# Patient Record
Sex: FEMALE | Race: WHITE | NOT HISPANIC OR LATINO | Employment: OTHER | URBAN - METROPOLITAN AREA
[De-identification: names, ages, dates, MRNs, and addresses within clinical notes are randomized per-mention and may not be internally consistent; named-entity substitution may affect disease eponyms.]

---

## 2022-03-09 ENCOUNTER — TRANSFERRED RECORDS (OUTPATIENT)
Dept: HEALTH INFORMATION MANAGEMENT | Facility: CLINIC | Age: 67
End: 2022-03-09
Payer: MEDICARE

## 2022-03-29 ENCOUNTER — PREP FOR PROCEDURE (OUTPATIENT)
Dept: OPHTHALMOLOGY | Facility: CLINIC | Age: 67
End: 2022-03-29
Payer: MEDICARE

## 2022-03-29 DIAGNOSIS — H50.9 STRABISMUS: Primary | ICD-10-CM

## 2022-04-03 ENCOUNTER — HEALTH MAINTENANCE LETTER (OUTPATIENT)
Age: 67
End: 2022-04-03

## 2022-04-25 DIAGNOSIS — Z11.59 ENCOUNTER FOR SCREENING FOR OTHER VIRAL DISEASES: Primary | ICD-10-CM

## 2022-05-09 ENCOUNTER — TELEPHONE (OUTPATIENT)
Dept: OPHTHALMOLOGY | Facility: CLINIC | Age: 67
End: 2022-05-09
Payer: MEDICARE

## 2022-05-09 NOTE — TELEPHONE ENCOUNTER
5/9/2022 10:33AM Recommended she call the [facility] Test Scheduling line at [phone] to schedule testing on Tuesday. Recommended she request testing at the [facility] about 30 minutes before her appointment in our clinic.    5/5/2022 3:20PM Avila LAWRENCE that testing is not available locally on Saturday or Sunday before surgery. She can have it performed on Monday and wonders if that is okay. She has an samson't here on Tuesday.

## 2022-05-23 RX ORDER — VENLAFAXINE HYDROCHLORIDE 150 MG/1
150 CAPSULE, EXTENDED RELEASE ORAL
COMMUNITY
Start: 2020-06-11 | End: 2023-05-17

## 2022-05-23 RX ORDER — PROPRANOLOL HYDROCHLORIDE 20 MG/1
20 TABLET ORAL
COMMUNITY
Start: 2020-09-01 | End: 2023-02-23

## 2022-05-23 RX ORDER — DONEPEZIL HYDROCHLORIDE 10 MG/1
20 TABLET, FILM COATED ORAL
COMMUNITY
Start: 2021-11-09

## 2022-05-23 RX ORDER — TRAZODONE HYDROCHLORIDE 100 MG/1
TABLET ORAL
COMMUNITY
Start: 2021-11-04

## 2022-05-23 RX ORDER — ROSUVASTATIN CALCIUM 5 MG/1
5 TABLET, COATED ORAL
COMMUNITY
Start: 2021-11-04

## 2022-05-23 RX ORDER — BUPROPION HYDROCHLORIDE 300 MG/1
300 TABLET ORAL
COMMUNITY
Start: 2022-05-17

## 2022-05-24 ENCOUNTER — OFFICE VISIT (OUTPATIENT)
Dept: OPHTHALMOLOGY | Facility: CLINIC | Age: 67
End: 2022-05-24
Attending: OPHTHALMOLOGY
Payer: MEDICARE

## 2022-05-24 ENCOUNTER — ANESTHESIA EVENT (OUTPATIENT)
Dept: SURGERY | Facility: CLINIC | Age: 67
End: 2022-05-24
Payer: MEDICARE

## 2022-05-24 ENCOUNTER — LAB (OUTPATIENT)
Dept: LAB | Facility: CLINIC | Age: 67
End: 2022-05-24
Attending: OPHTHALMOLOGY

## 2022-05-24 DIAGNOSIS — H50.15 ALTERNATING EXOTROPIA: Primary | ICD-10-CM

## 2022-05-24 DIAGNOSIS — Z11.59 ENCOUNTER FOR SCREENING FOR OTHER VIRAL DISEASES: ICD-10-CM

## 2022-05-24 DIAGNOSIS — H50.21 HYPERTROPIA OF RIGHT EYE: ICD-10-CM

## 2022-05-24 LAB — SARS-COV-2 RNA RESP QL NAA+PROBE: NEGATIVE

## 2022-05-24 PROCEDURE — U0003 INFECTIOUS AGENT DETECTION BY NUCLEIC ACID (DNA OR RNA); SEVERE ACUTE RESPIRATORY SYNDROME CORONAVIRUS 2 (SARS-COV-2) (CORONAVIRUS DISEASE [COVID-19]), AMPLIFIED PROBE TECHNIQUE, MAKING USE OF HIGH THROUGHPUT TECHNOLOGIES AS DESCRIBED BY CMS-2020-01-R: HCPCS | Performed by: OPHTHALMOLOGY

## 2022-05-24 PROCEDURE — G0463 HOSPITAL OUTPT CLINIC VISIT: HCPCS | Mod: 25

## 2022-05-24 PROCEDURE — 92015 DETERMINE REFRACTIVE STATE: CPT | Mod: GY

## 2022-05-24 PROCEDURE — 92004 COMPRE OPH EXAM NEW PT 1/>: CPT | Mod: 57 | Performed by: OPHTHALMOLOGY

## 2022-05-24 PROCEDURE — 92060 SENSORIMOTOR EXAMINATION: CPT | Performed by: OPHTHALMOLOGY

## 2022-05-24 PROCEDURE — 92285 EXTERNAL OCULAR PHOTOGRAPHY: CPT | Performed by: OPHTHALMOLOGY

## 2022-05-24 ASSESSMENT — VISUAL ACUITY
METHOD: SNELLEN - LINEAR
OD_CC: 20/25
OS_CC+: -2
CORRECTION_TYPE: GLASSES
OS_CC: 20/25

## 2022-05-24 ASSESSMENT — REFRACTION_WEARINGRX
OD_ADD: +3.00
SPECS_TYPE: PAL
OD_SPHERE: +0.50
OD_AXIS: 035
OS_SPHERE: PLANO
OS_CYLINDER: +0.50
OD_CYLINDER: +0.75
OS_ADD: +3.00
OS_AXIS: 015

## 2022-05-24 ASSESSMENT — SLIT LAMP EXAM - LIDS
COMMENTS: NORMAL
COMMENTS: NORMAL

## 2022-05-24 ASSESSMENT — CONF VISUAL FIELD
METHOD: COUNTING FINGERS
OD_NORMAL: 1
OS_NORMAL: 1

## 2022-05-24 ASSESSMENT — EXTERNAL EXAM - LEFT EYE: OS_EXAM: NORMAL

## 2022-05-24 ASSESSMENT — REFRACTION
OD_SPHERE: +0.75
OS_CYLINDER: SPHERE
OD_AXIS: 035
OD_CYLINDER: +1.25
OS_SPHERE: -0.75

## 2022-05-24 ASSESSMENT — CUP TO DISC RATIO
OS_RATIO: 0.3
OD_RATIO: 0.3

## 2022-05-24 ASSESSMENT — EXTERNAL EXAM - RIGHT EYE: OD_EXAM: NORMAL

## 2022-05-24 ASSESSMENT — TONOMETRY
IOP_METHOD: ICARE S/S GW
OS_IOP_MMHG: 12
OD_IOP_MMHG: 14

## 2022-05-24 NOTE — NURSING NOTE
Chief Complaint(s) and History of Present Illness(es)     Esotropia Evaluation     Laterality: both eyes    Onset: present since childhood    Quality: horizontal    Course: gradually worsening    Associated symptoms: Negative for unequal pupil size, blurred vision and headaches    Treatments tried: surgery    Comments: H/O strab repair in [place], [place] in early 1980s. Slowly drifting out over time.               Comments     Started wearing glasses at 1 years old. Patient had strabismus surgery 1980's for ET, worked well. Had cataract surgery, last fall, patient noticed increasing XT, described as constant.    Rarely has diplopia, can elicit it only when working at near after prolonged amount of time. Pt unsure if h/o patched. Diplopia does not interfere with daily activities.     No diabetes, high blood pressure, no known thyroid disorder.     In remission from colon cancer x 2 years.

## 2022-05-24 NOTE — PROGRESS NOTES
"Chief Complaints and History of Present Illnesses   Patient presents with     Esotropia Evaluation     H/O strab repair in [place], [place] in early 1980s. Slowly drifting out over time.    Review of systems for the eyes was negative other than the pertinent positives and negatives noted in the HPI.  History is obtained from the patient and .    Referring provider: Gilbert Ragsdale     Primary care: Dayanna Garcia MD   Assessment   Andie Waddell is a 66 year old female who presents with:       ICD-10-CM    1. Alternating exotropia  H50.15 Sensorimotor     External Photos 9 [employer] Gazes   2. Hypertropia of right eye  H50.21 Sensorimotor     External Photos 9 [employer] Gazes         Plan  Mrs. Waddell has exotropia years after [facility] surgery in adulthood (had glasses for ET in childhood)  I recommend eye muscle surgery. Today with Andie and her , I reviewed the indications, risks, benefits, and alternatives of eye muscle surgery including, but not limited to, failure obtain the desired ocular alignment (\"over\" or \"under\" correction), diplopia, and damage to any structure in or around the eye that may necessitate treatment with medicine, laser, or surgery. I further explained that the goal of surgery is to help control Andie's strabismus. Surgery will not \"cure\" Andie's strabismus or resolve/prevent the need for refractive correction. Additional strabismus surgery may be required in the short or long term. I emphasized that regular follow-up to monitor and optimize her vision and alignment would be necessary. We also discussed the risks of surgical injury, bleeding, and infection which may necessitate further medical or surgical treatment and which may result in diplopia, loss of vision, blindness, or loss of the eye(s) in less than 1% of cases and the remote possibility of permanent damage to any organ system or death with the use of general anesthesia.  I explained that we would hide visible scars as much " "as possible in natural creases but that every patient heals and pigments differently resulting in a variable degree of scarring to the eyes or surrounding facial structures after surgery.  I provided multiple opportunities for questions, answered all questions to the best of my ability, and confirmed that my answers and my discussion were understood.         Further details of the management plan can be found in the \"Patient Instructions\" section which was printed and given to the patient at checkout.  No follow-ups on file.   Attending Physician Attestation:  Complete documentation of historical and exam elements from today's encounter can be found in the full encounter summary report (not reduplicated in this progress note).  I personally obtained the chief complaint(s) and history of present illness.  I confirmed and edited as necessary the review of systems, past medical/surgical history, family history, social history, and examination findings as documented by others; and I examined the patient myself.  I personally reviewed the relevant tests, images, and reports as documented above.  I formulated and edited as necessary the assessment and plan and discussed the findings and management plan with the patient and family. - Julianne Melendez MD 5/24/2022 2:29 PM        "

## 2022-05-25 ENCOUNTER — HOSPITAL ENCOUNTER (OUTPATIENT)
Facility: CLINIC | Age: 67
Discharge: HOME | End: 2022-05-25
Attending: OPHTHALMOLOGY | Admitting: OPHTHALMOLOGY
Payer: MEDICARE

## 2022-05-25 ENCOUNTER — ANESTHESIA (OUTPATIENT)
Dept: SURGERY | Facility: CLINIC | Age: 67
End: 2022-05-25
Payer: MEDICARE

## 2022-05-25 VITALS
SYSTOLIC BLOOD PRESSURE: 113 MMHG | OXYGEN SATURATION: 97 % | DIASTOLIC BLOOD PRESSURE: 63 MMHG | BODY MASS INDEX: 26.35 KG/M2 | RESPIRATION RATE: 16 BRPM | HEIGHT: 64 IN | TEMPERATURE: 97.5 F | HEART RATE: 57 BPM | WEIGHT: 154.32 LBS

## 2022-05-25 DIAGNOSIS — Z98.890 POSTOPERATIVE EYE STATE: Primary | ICD-10-CM

## 2022-05-25 LAB — GLUCOSE BLDC GLUCOMTR-MCNC: 117 MG/DL (ref 70–99)

## 2022-05-25 PROCEDURE — 250N000009 HC RX 250: Performed by: NURSE ANESTHETIST, CERTIFIED REGISTERED

## 2022-05-25 PROCEDURE — 710N000010 HC RECOVERY PHASE 1, LEVEL 2, PER MIN: Performed by: OPHTHALMOLOGY

## 2022-05-25 PROCEDURE — 250N000011 HC RX IP 250 OP 636: Performed by: NURSE ANESTHETIST, CERTIFIED REGISTERED

## 2022-05-25 PROCEDURE — 67332 REREVISE EYE MUSCLES ADD-ON: CPT | Mod: XS | Performed by: OPHTHALMOLOGY

## 2022-05-25 PROCEDURE — 360N000076 HC SURGERY LEVEL 3, PER MIN: Performed by: OPHTHALMOLOGY

## 2022-05-25 PROCEDURE — 250N000011 HC RX IP 250 OP 636: Performed by: OPHTHALMOLOGY

## 2022-05-25 PROCEDURE — 999N000141 HC STATISTIC PRE-PROCEDURE NURSING ASSESSMENT: Performed by: OPHTHALMOLOGY

## 2022-05-25 PROCEDURE — 250N000013 HC RX MED GY IP 250 OP 250 PS 637: Performed by: ANESTHESIOLOGY

## 2022-05-25 PROCEDURE — 250N000009 HC RX 250: Performed by: OPHTHALMOLOGY

## 2022-05-25 PROCEDURE — 250N000025 HC SEVOFLURANE, PER MIN: Performed by: OPHTHALMOLOGY

## 2022-05-25 PROCEDURE — 258N000003 HC RX IP 258 OP 636: Performed by: NURSE ANESTHETIST, CERTIFIED REGISTERED

## 2022-05-25 PROCEDURE — 272N000001 HC OR GENERAL SUPPLY STERILE: Performed by: OPHTHALMOLOGY

## 2022-05-25 PROCEDURE — 67312 REVISE TWO EYE MUSCLES: CPT | Mod: RT | Performed by: OPHTHALMOLOGY

## 2022-05-25 PROCEDURE — 710N000012 HC RECOVERY PHASE 2, PER MINUTE: Performed by: OPHTHALMOLOGY

## 2022-05-25 PROCEDURE — 370N000017 HC ANESTHESIA TECHNICAL FEE, PER MIN: Performed by: OPHTHALMOLOGY

## 2022-05-25 PROCEDURE — 67340 REVISE EYE MUSCLE ADD-ON: CPT | Mod: LT | Performed by: OPHTHALMOLOGY

## 2022-05-25 PROCEDURE — 250N000011 HC RX IP 250 OP 636: Performed by: ANESTHESIOLOGY

## 2022-05-25 PROCEDURE — 2894A VOIDCORRECT: CPT | Mod: RT | Performed by: OPHTHALMOLOGY

## 2022-05-25 PROCEDURE — 82962 GLUCOSE BLOOD TEST: CPT

## 2022-05-25 RX ORDER — BALANCED SALT SOLUTION 6.4; .75; .48; .3; 3.9; 1.7 MG/ML; MG/ML; MG/ML; MG/ML; MG/ML; MG/ML
SOLUTION OPHTHALMIC PRN
Status: DISCONTINUED | OUTPATIENT
Start: 2022-05-25 | End: 2022-05-25 | Stop reason: HOSPADM

## 2022-05-25 RX ORDER — KETOROLAC TROMETHAMINE 30 MG/ML
INJECTION, SOLUTION INTRAMUSCULAR; INTRAVENOUS PRN
Status: DISCONTINUED | OUTPATIENT
Start: 2022-05-25 | End: 2022-05-25

## 2022-05-25 RX ORDER — ACETAMINOPHEN 325 MG/1
975 TABLET ORAL ONCE
Status: COMPLETED | OUTPATIENT
Start: 2022-05-25 | End: 2022-05-25

## 2022-05-25 RX ORDER — DEXAMETHASONE SODIUM PHOSPHATE 4 MG/ML
INJECTION, SOLUTION INTRA-ARTICULAR; INTRALESIONAL; INTRAMUSCULAR; INTRAVENOUS; SOFT TISSUE PRN
Status: DISCONTINUED | OUTPATIENT
Start: 2022-05-25 | End: 2022-05-25

## 2022-05-25 RX ORDER — EPHEDRINE SULFATE 50 MG/ML
INJECTION, SOLUTION INTRAMUSCULAR; INTRAVENOUS; SUBCUTANEOUS PRN
Status: DISCONTINUED | OUTPATIENT
Start: 2022-05-25 | End: 2022-05-25

## 2022-05-25 RX ORDER — GLYCOPYRROLATE 0.2 MG/ML
INJECTION, SOLUTION INTRAMUSCULAR; INTRAVENOUS PRN
Status: DISCONTINUED | OUTPATIENT
Start: 2022-05-25 | End: 2022-05-25

## 2022-05-25 RX ORDER — OXYMETAZOLINE HYDROCHLORIDE 0.05 G/100ML
SPRAY NASAL PRN
Status: DISCONTINUED | OUTPATIENT
Start: 2022-05-25 | End: 2022-05-25 | Stop reason: HOSPADM

## 2022-05-25 RX ORDER — SODIUM CHLORIDE, SODIUM LACTATE, POTASSIUM CHLORIDE, CALCIUM CHLORIDE 600; 310; 30; 20 MG/100ML; MG/100ML; MG/100ML; MG/100ML
INJECTION, SOLUTION INTRAVENOUS CONTINUOUS
Status: DISCONTINUED | OUTPATIENT
Start: 2022-05-25 | End: 2022-05-25 | Stop reason: HOSPADM

## 2022-05-25 RX ORDER — SODIUM CHLORIDE, SODIUM LACTATE, POTASSIUM CHLORIDE, CALCIUM CHLORIDE 600; 310; 30; 20 MG/100ML; MG/100ML; MG/100ML; MG/100ML
INJECTION, SOLUTION INTRAVENOUS CONTINUOUS PRN
Status: DISCONTINUED | OUTPATIENT
Start: 2022-05-25 | End: 2022-05-25

## 2022-05-25 RX ORDER — BETAMETHASONE SODIUM PHOSPHATE AND BETAMETHASONE ACETATE 3; 3 MG/ML; MG/ML
INJECTION, SUSPENSION INTRA-ARTICULAR; INTRALESIONAL; INTRAMUSCULAR; SOFT TISSUE PRN
Status: DISCONTINUED | OUTPATIENT
Start: 2022-05-25 | End: 2022-05-25 | Stop reason: HOSPADM

## 2022-05-25 RX ORDER — LIDOCAINE HYDROCHLORIDE 20 MG/ML
INJECTION, SOLUTION INFILTRATION; PERINEURAL PRN
Status: DISCONTINUED | OUTPATIENT
Start: 2022-05-25 | End: 2022-05-25

## 2022-05-25 RX ORDER — ONDANSETRON 2 MG/ML
4 INJECTION INTRAMUSCULAR; INTRAVENOUS EVERY 30 MIN PRN
Status: DISCONTINUED | OUTPATIENT
Start: 2022-05-25 | End: 2022-05-25 | Stop reason: HOSPADM

## 2022-05-25 RX ORDER — OXYCODONE HYDROCHLORIDE 5 MG/1
5 TABLET ORAL EVERY 6 HOURS PRN
Qty: 12 TABLET | Refills: 0 | Status: SHIPPED | OUTPATIENT
Start: 2022-05-25 | End: 2022-05-28

## 2022-05-25 RX ORDER — PROPOFOL 10 MG/ML
INJECTION, EMULSION INTRAVENOUS PRN
Status: DISCONTINUED | OUTPATIENT
Start: 2022-05-25 | End: 2022-05-25

## 2022-05-25 RX ORDER — ONDANSETRON 2 MG/ML
INJECTION INTRAMUSCULAR; INTRAVENOUS PRN
Status: DISCONTINUED | OUTPATIENT
Start: 2022-05-25 | End: 2022-05-25

## 2022-05-25 RX ORDER — FENTANYL CITRATE 50 UG/ML
INJECTION, SOLUTION INTRAMUSCULAR; INTRAVENOUS PRN
Status: DISCONTINUED | OUTPATIENT
Start: 2022-05-25 | End: 2022-05-25

## 2022-05-25 RX ORDER — ONDANSETRON 4 MG/1
4 TABLET, ORALLY DISINTEGRATING ORAL EVERY 30 MIN PRN
Status: DISCONTINUED | OUTPATIENT
Start: 2022-05-25 | End: 2022-05-25 | Stop reason: HOSPADM

## 2022-05-25 RX ORDER — FENTANYL CITRATE 50 UG/ML
25 INJECTION, SOLUTION INTRAMUSCULAR; INTRAVENOUS EVERY 5 MIN PRN
Status: DISCONTINUED | OUTPATIENT
Start: 2022-05-25 | End: 2022-05-25 | Stop reason: HOSPADM

## 2022-05-25 RX ORDER — OXYCODONE HYDROCHLORIDE 5 MG/1
5 TABLET ORAL ONCE
Status: COMPLETED | OUTPATIENT
Start: 2022-05-25 | End: 2022-05-25

## 2022-05-25 RX ADMIN — GLYCOPYRROLATE 0.08 MG: 0.2 INJECTION, SOLUTION INTRAMUSCULAR; INTRAVENOUS at 12:46

## 2022-05-25 RX ADMIN — FENTANYL CITRATE 25 MCG: 50 INJECTION, SOLUTION INTRAMUSCULAR; INTRAVENOUS at 12:42

## 2022-05-25 RX ADMIN — Medication 5 MG: at 13:02

## 2022-05-25 RX ADMIN — Medication 5 MG: at 12:49

## 2022-05-25 RX ADMIN — PROPOFOL 160 MG: 10 INJECTION, EMULSION INTRAVENOUS at 12:31

## 2022-05-25 RX ADMIN — DEXAMETHASONE SODIUM PHOSPHATE 4 MG: 4 INJECTION, SOLUTION INTRAMUSCULAR; INTRAVENOUS at 12:40

## 2022-05-25 RX ADMIN — LIDOCAINE HYDROCHLORIDE 60 MG: 20 INJECTION, SOLUTION INFILTRATION; PERINEURAL at 12:31

## 2022-05-25 RX ADMIN — KETOROLAC TROMETHAMINE 15 MG: 30 INJECTION, SOLUTION INTRAMUSCULAR at 14:44

## 2022-05-25 RX ADMIN — ONDANSETRON 4 MG: 2 INJECTION INTRAMUSCULAR; INTRAVENOUS at 12:55

## 2022-05-25 RX ADMIN — GLYCOPYRROLATE 0.02 MG: 0.2 INJECTION, SOLUTION INTRAMUSCULAR; INTRAVENOUS at 12:41

## 2022-05-25 RX ADMIN — Medication 5 MG: at 12:44

## 2022-05-25 RX ADMIN — Medication 5 MG: at 12:40

## 2022-05-25 RX ADMIN — SODIUM CHLORIDE, POTASSIUM CHLORIDE, SODIUM LACTATE AND CALCIUM CHLORIDE: 600; 310; 30; 20 INJECTION, SOLUTION INTRAVENOUS at 12:20

## 2022-05-25 RX ADMIN — FENTANYL CITRATE 25 MCG: 50 INJECTION, SOLUTION INTRAMUSCULAR; INTRAVENOUS at 15:21

## 2022-05-25 RX ADMIN — Medication 5 MG: at 12:58

## 2022-05-25 RX ADMIN — OXYCODONE HYDROCHLORIDE 5 MG: 5 TABLET ORAL at 15:55

## 2022-05-25 RX ADMIN — GLYCOPYRROLATE 0.02 MG: 0.2 INJECTION, SOLUTION INTRAMUSCULAR; INTRAVENOUS at 12:39

## 2022-05-25 RX ADMIN — ONDANSETRON 4 MG: 2 INJECTION INTRAMUSCULAR; INTRAVENOUS at 15:28

## 2022-05-25 RX ADMIN — ACETAMINOPHEN 975 MG: 325 TABLET, FILM COATED ORAL at 15:24

## 2022-05-25 RX ADMIN — GLYCOPYRROLATE 0.04 MG: 0.2 INJECTION, SOLUTION INTRAMUSCULAR; INTRAVENOUS at 12:37

## 2022-05-25 RX ADMIN — FENTANYL CITRATE 25 MCG: 50 INJECTION, SOLUTION INTRAMUSCULAR; INTRAVENOUS at 13:23

## 2022-05-25 RX ADMIN — FENTANYL CITRATE 25 MCG: 50 INJECTION, SOLUTION INTRAMUSCULAR; INTRAVENOUS at 15:34

## 2022-05-25 RX ADMIN — FENTANYL CITRATE 25 MCG: 50 INJECTION, SOLUTION INTRAMUSCULAR; INTRAVENOUS at 13:07

## 2022-05-25 NOTE — ANESTHESIA PREPROCEDURE EVALUATION
Anesthesia Pre-Procedure Evaluation    Patient: Andie Waddell   MRN: [id number] : 1955        Procedure : Procedure(s):  Strabismus repair, 1 or both eyes          Past Medical History:   Diagnosis Date     Exotropia, alternating       Past Surgical History:   Procedure Laterality Date     STRABISMUS SURGERY Bilateral     Esotropia, early  in [place], [place]      Allergies   Allergen Reactions     Codeine Nausea and Vomiting and Other (See Comments)     Unconsciousness       Social History     Tobacco Use     Smoking status: Former Smoker     Smokeless tobacco: Never Used   Substance Use Topics     Alcohol use: Not Currently      Wt Readings from Last 1 Encounters:   22 70 kg (154 lb 5.2 oz)        Anesthesia Evaluation   Pt has had prior anesthetic. Type: General.    No history of anesthetic complications       ROS/MED HX  ENT/Pulmonary:     (+) sleep apnea, doesn't use CPAP,     Neurologic: Comment: One time seizure decades ago.      Neurocognitive disorder    (+) dementia,     Cardiovascular:     (+) hypertension-----    METS/Exercise Tolerance: >4 METS    Hematologic:       Musculoskeletal: Comment: Kienbock's Disease (wrist disease)      GI/Hepatic:       Renal/Genitourinary:       Endo:       Psychiatric/Substance Use:     (+) psychiatric history depression alcohol abuse (Quit in )     Infectious Disease:       Malignancy:   (+) Malignancy, History of GI.GI CA  Remission status post Surgery.        Other:      (+) , H/O Chronic Pain,        Physical Exam    Airway  airway exam normal      Mallampati: II   TM distance: > 3 FB   Neck ROM: full   Mouth opening: > 3 cm    Respiratory Devices and Support         Dental  no notable dental history         Cardiovascular   cardiovascular exam normal          Pulmonary   pulmonary exam normal                OUTSIDE LABS:  CBC: No results found for: WBC, HGB, HCT, PLT  BMP:   Lab Results   Component Value Date     (H) 2022     COAGS: No  results found for: PTT, INR, FIBR  POC: No results found for: BGM, HCG, HCGS  HEPATIC: No results found for: ALBUMIN, PROTTOTAL, ALT, AST, GGT, ALKPHOS, BILITOTAL, BILIDIRECT, LAURA  OTHER: No results found for: PH, LACT, A1C, BALWINDER, PHOS, MAG, LIPASE, AMYLASE, TSH, T4, T3, CRP, SED    Anesthesia Plan    ASA Status:  2   NPO Status:  NPO Appropriate    Anesthesia Type: General.     - Airway: LMA   Induction: Intravenous.   Maintenance: Balanced.        Consents    Anesthesia Plan(s) and associated risks, benefits, and realistic alternatives discussed. Questions answered and patient/representative(s) expressed understanding.    - Discussed:     - Discussed with:  Patient      - Extended Intubation/Ventilatory Support Discussed: No.      - Patient is DNR/DNI Status: No    Use of blood products discussed: No .     Postoperative Care    Pain management: IV analgesics, Oral pain medications.   PONV prophylaxis: Ondansetron (or other 5HT-3), Dexamethasone or Solumedrol     Comments:    Other Comments: Discussed common and potentially harmful risks for General Anesthesia.   These risks include, but were not limited to: Sore throat, Airway injury, Dental injury, Aspiration, Respiratory issues (Bronchospasm, Laryngospasm, Desaturation), Hemodynamic issues (Arrhythmia, Hypotension, Ischemia), Potential long term consequences of respiratory and hemodynamic issues, PONV, Emergence delirium/agitation  Risks of invasive procedures were not discussed: N/A    All questions were answered.            Nicole Reid MD

## 2022-05-25 NOTE — ANESTHESIA POSTPROCEDURE EVALUATION
Patient: Andie Waddell    Procedure: Procedure(s):  Bilateral Strabismus repair       Anesthesia Type:  General    Note:     Postop Pain Control: Uneventful            Sign Out: Well controlled pain   PONV: No   Neuro/Psych: Uneventful            Sign Out: Acceptable/Baseline neuro status   Airway/Respiratory: Uneventful            Sign Out: Acceptable/Baseline resp. status   CV/Hemodynamics: Uneventful            Sign Out: Acceptable CV status; No obvious hypovolemia; No obvious fluid overload   Other NRE: NONE   DID A NON-ROUTINE EVENT OCCUR? No           Last vitals:  Vitals Value Taken Time   /67 05/25/22 1515   Temp     Pulse 65 05/25/22 1517   Resp 18 05/25/22 1517   SpO2 97 % 05/25/22 1517   Vitals shown include unvalidated device data.    Electronically Signed By: Edd Brooke DO  May 25, 2022  3:18 PM

## 2022-05-25 NOTE — ANESTHESIA PROCEDURE NOTES
Airway       Patient location during procedure: OR       Procedure Start/Stop Times: 5/25/2022 12:34 PM  Staff -        CRNA: Juan Cummings APRN CRNA       Performed By: CRNAIndications and Patient Condition       Indications for airway management: arthur-procedural       Induction type:intravenous       Mask difficulty assessment: 0 - not attempted    Final Airway Details       Final airway type: supraglottic airway    Supraglottic Airway Details        Type: LMA       Brand: Air-Q       LMA size: 4.5    Post intubation assessment        Placement verified by: capnometry, equal breath sounds and chest rise        Number of attempts at approach: 1       Number of other approaches attempted: 0       Secured with: silk tape       Ease of procedure: easy       Dentition: Intact and Unchanged    Medication(s) Administered   Medication Administration Time: 5/25/2022 12:34 PM

## 2022-05-25 NOTE — ANESTHESIA CARE TRANSFER NOTE
Patient: Andie Waddell    Procedure: Procedure(s):  Bilateral Strabismus repair       Diagnosis: Strabismus [H50.9]  Diagnosis Additional Information: No value filed.    Anesthesia Type:   General     Note:    Oropharynx: oropharynx clear of all foreign objects  Level of Consciousness: awake  Oxygen Supplementation: nasal cannula    Independent Airway: airway patency satisfactory and stable  Dentition: dentition unchanged  Vital Signs Stable: post-procedure vital signs reviewed and stable  Report to RN Given: handoff report given  Patient transferred to: PACU    Handoff Report: Identifed the Patient, Identified the Reponsible Provider, Reviewed the pertinent medical history, Discussed the surgical course, Reviewed Intra-OP anesthesia mangement and issues during anesthesia, Set expectations for post-procedure period and Allowed opportunity for questions and acknowledgement of understanding      Vitals:  Vitals Value Taken Time   /75 05/25/22 1458   Temp     Pulse 69 05/25/22 1458   Resp 11 05/25/22 1500   SpO2 94 % 05/25/22 1500   Vitals shown include unvalidated device data.    Electronically Signed By: PRICILA Dunlap CRNA  May 25, 2022  3:02 PM

## 2022-05-25 NOTE — DISCHARGE INSTRUCTIONS
Strabismus Repair Discharge Instructions    Dr. Julianne Melendez, Dr. Prashanth Pink, Dr. Homa Lawler      Your eye doctor performed surgery to help align your eye(s). During surgery, certain eye muscles are adjusted. This helps the muscles better control how the eye moves. Often, surgery is done in addition to other treatments.   How Surgery Works  Strabismus surgery is a safe, common operation. The doctor changes the placement or length of an eye muscle. This small change can pull the eye into proper alignment. The two most common methods of surgery are:  Recession, in which a muscle is moved to a new position on the eye.  Resection, in which a small section of an eye muscle is removed.  What to Expect  It is normal to experience the following:  Eye Redness. This will resolve slowly over 2- 4 weeks.  Pink tinged tears  Swollen, painful or itchy eyes  Sensitivity to bright lights.  Trouble opening eyes for 1-2 days.  Feeling dizzy or off balance until you get used to seeing differently.  After Surgery Care  Avoid rubbing your eyes.  You may use cool cloths to help with pain and/or itching.  Minimize direct contact with water for 1 week. Showering or bathing is allowed.  You may use a warm, wet washcloth to remove any dried drainage from the eye. Wipe eye from inner corner to the outer corner of the eye.   No swimming for 1 week.  Use sunglasses or a hat to protect eyes from bright lights if you are light sensitive.  You may wear glasses as soon as needed.  No heavy lifting or contact sports for 1 week.   Use eye drops or eye ointment as directed to prevent infection and decrease swelling. Avoid touching surface of eye with the tube or bottle.   Suture Care  Sutures will dissolve over several weeks; they will not need to be removed.   Adjustable sutures may have been placed during surgery. You may need to stay in the recovery room for a longer period after surgery before a possible suture adjustment is performed. Once the  suture is adjusted you will be sent home.   When to Call the Doctor   Eyelids are progressively getting more swollen and painful after 24 hours.  You see a dramatic change in the position of the eye over time.  Frequent or continuous vomiting.  Green or yellow drainage from the eye.  Temperature over 101 degrees.  If you experiences worsening RSVP (Redness, Sensitivity to light, Vision, Pain), or develops fever or worsening discharge, call EITHER  [phone] (8am-4:30pm Monday-Friday)  [phone] (after hours & weekends)  and ask to speak with the Ophthalmology Resident or Fellow On-Call or return to the eye clinic or emergency room immediately.          Follow Up  Follow up with your doctor in tomorrow, 5/26/2022 @ 9:20am  Rev. 3/2018           Same-Day Surgery   Adult Discharge Orders & Instructions     For 24 hours after surgery:  Get plenty of rest.  A responsible adult must stay with you for at least 24 hours after you leave the hospital.   Pain medication can slow your reflexes. Do not drive or use heavy equipment.  If you have weakness or tingling, don't drive or use heavy equipment until this feeling goes away.  Mixing alcohol and pain medication can cause dizziness and slow your breathing. It can even be fatal. Do not drink alcohol while taking pain medication.  Avoid strenuous or risky activities.  Ask for help when climbing stairs.   You may feel lightheaded.  If so, sit for a few minutes before standing.  Have someone help you get up.   If you have nausea (feel sick to your stomach), drink only clear liquids such as apple juice, ginger ale, broth or 7-Up.  Rest may also help.  Be sure to drink enough fluids.  Move to a regular diet as you feel able. Take pain medications with a small amount of solid food, such as toast or crackers, to avoid nausea.   A slight fever is normal. Call the doctor if your fever is over 100 F (37.7 C) (taken under the tongue) or lasts longer than 24 hours.  You may have  a dry mouth, muscle aches, trouble sleeping or a sore throat.  These symptoms should go away after 24 hours.  Do not make important or legal decisions.   Pain Management:      1. Take pain medication (if prescribed) for pain as directed by your physician.        2. WARNING: If the pain medication you have been prescribed contains Tylenol  (acetaminophen), DO NOT take additional doses of Tylenol (acetaminophen).     Call your doctor for any of the followin.  Signs of infection (fever, growing tenderness at the surgery site, severe pain, a large amount of drainage or bleeding, foul-smelling drainage, redness, swelling).    2.  It has been over 8 to 10 hours since surgery and you are still not able to urinate (pee).    3.  Headache for over 24 hours.    4.  Numbness, tingling or weakness the day after surgery (if you had spinal anesthesia).  To contact a doctor, call Dr. Melendez's clinic Mon-Fri or:  '   [phone] and ask for the Resident On Call for:          Ophthalmology (answered 24 hours a day)  '   Emergency Department:  [facility]: [phone]  [place] [facility]: [phone]               Rev. 10/2014

## 2022-05-26 ENCOUNTER — OFFICE VISIT (OUTPATIENT)
Dept: OPHTHALMOLOGY | Facility: CLINIC | Age: 67
End: 2022-05-26
Attending: OPHTHALMOLOGY
Payer: MEDICARE

## 2022-05-26 DIAGNOSIS — H50.15 ALTERNATING EXOTROPIA: Primary | ICD-10-CM

## 2022-05-26 DIAGNOSIS — H50.21 HYPERTROPIA OF RIGHT EYE: ICD-10-CM

## 2022-05-26 PROCEDURE — 99024 POSTOP FOLLOW-UP VISIT: CPT | Performed by: OPHTHALMOLOGY

## 2022-05-26 ASSESSMENT — VISUAL ACUITY
OD_CC+: -2
OS_CC+: -1
CORRECTION_TYPE: GLASSES
OS_CC: 20/20
METHOD: SNELLEN - LINEAR
OD_CC: 20/30

## 2022-05-26 NOTE — NURSING NOTE
Chief Complaint(s) and History of Present Illness(es)     Post Op (Ophthalmology) Both Eyes               Comments     POD1 s/p strabismus repair (5/25/22).  Hard time falling asleep, was able to sleep at 3am. Minimal pain today, took oxy at 3am.   Notes pulling sensation with eye movement.  No diplopia.

## 2022-06-03 NOTE — OP NOTE
Procedure Date: 05/25/2022    SURGEON:  Julianne Melendez MD    PREOPERATIVE DIAGNOSES:   1.  Consecutive exotropia.  2.  History of esotropia, status post bimedial rectus recession in the 1980s in [place].    POSTOPERATIVE DIAGNOSES:   1.  Consecutive exotropia.  2.  History of esotropia, status post bimedial rectus recession in the 1980s in [place].    PROCEDURES:   1.  Forced duction testing.  2.  Exploration, lysis of adhesions on previously operated bimedial rectus muscles.  3.  Excision of 6.5 mm stretch scar from the right medial rectus muscle.  4.  Right lateral rectus recession of 4.0 mm.  5.  Subconjunctival injections of Celestone, both eyes.    DESCRIPTION OF PROCEDURE:  After informed consent was obtained, Ms. Waddell was taken to the operating room where general anesthesia was induced without complication.  She was prepped and draped in sterile ophthalmic fashion.  A timeout was performed.  Lid speculae were placed in both eyes and forced duction testing was performed.  There was 1+ tightness to adduction in the right eye.     The lid speculum was removed from the left eye and an occluder was placed.  A 5-0 silk traction suture placed at 6 and 12 o'clock of the limbus of the right eye and the eye was placed in the abducted position.  Nasal conjunctival peritomy was performed.  The infranasal and supranasal quadrants were dissected.  The right medial rectus muscle was hooked using small and Rodrigo hooks.  The conjunctiva was carefully thinned and the scar tissue was lysed, as this was a previously operated muscle.  The muscle was carefully examined and noted to have 6.5 mm of stretch scar present.  It was noted to be 6.5 mm recessed from the original insertion.  The lid speculum was removed from the left eye and an occluder was placed.  A 5-0 silk traction suture placed at 6 and 12 o'clock of the limbus and the eye was placed in the abducted position.  Nasal conjunctival peritomy was performed.  The  infranasal and supranasal quadrants were dissected.  The left medial rectus muscle was hooked using small and Rodrigo hooks.  The Tenon's was cleaned posterior from the muscle belly and then conjunctiva was carefully thinned as this was a previously operated muscle.  The muscle was noted to be 6.5 mm posterior to the original insertion, but did not have a stretch scar present.  An 8-0 Vicryl suture was used to repair the conjunctiva.  The lid speculum and traction sutures were removed from the left eye and an occluder was placed.     Attention was then turned to the right eye.  The eye was placed in the adducted position.  A temporal conjunctival peritomy was performed.  The inferotemporal and superotemporal quadrants were dissected.  The right lateral rectus muscle was hooked using small and [place] hooks.  The Tenon's was cleaned posterior from the muscle belly.  A 6-0 double-armed Vicryl suture was used to imbricate the muscle at the insertion using central and peripheral locking bites.  The muscle was disinserted from the globe.  Cautery was used for hemostasis.  A caliper was used to measure 4.0 mm posterior to the original insertion.  This was marked with a marking pen.  Scleral passes were performed at these marks and the muscle was tied down.  An 8-0 Vicryl suture was used to repair the temporal conjunctival.  The eye was then placed in the abducted position.  A 6-0 double-armed [place] suture was used to imbricate the medial rectus muscle just posterior to the stretch scar using central and peripheral locking bites.  A straight clamp was placed anterior to the suture and the muscle was transected anterior to the clamp.  Cautery was used at the clamp edge and the clamp was removed.  The remaining muscle stump was excised from the globe.  A caliper was used to measure 6.0 mm posterior to the original insertion.  This was marked with a marking pen.  Scleral passes were performed at these marks and the muscle  was tied in a bow.  Snap back testing showed the eyes to be in good alignment, so the muscle was tied down in this position.  An 8-0 Vicryl suture was used to repair the conjunctiva.     Subconjunctival injections of Celestone were given to both eyes.  TobraDex ointment was placed in both eyes after the traction sutures and lid speculum were removed.  Ms. Waddell tolerated the procedure well and went to the recovery room in stable condition.  She will follow up on postoperative day #1 in the Eye Clinic.    Julianne Melendez MD        D: 2022   T: 2022   MT: [id number]    Name:     GALINA WADDELL  MRN:      [id number]        Account:        [id number]   :      1955           Procedure Date: 2022     Document: [id number]

## 2022-06-07 ASSESSMENT — SLIT LAMP EXAM - LIDS
COMMENTS: NORMAL
COMMENTS: NORMAL

## 2022-06-07 ASSESSMENT — EXTERNAL EXAM - RIGHT EYE: OD_EXAM: NORMAL

## 2022-06-07 ASSESSMENT — EXTERNAL EXAM - LEFT EYE: OS_EXAM: NORMAL

## 2022-06-07 NOTE — PROGRESS NOTES
"Chief Complaints and History of Present Illnesses   Patient presents with     Post Op (Ophthalmology) Both Eyes   Review of systems for the eyes was negative other than the pertinent positives and negatives noted in the HPI.  History is obtained from the patient and .    Referring provider: Gilbert Ragsdale     Primary care: Dayanna Garcia MD   Assessment   Andie Waddell is a 66 year old female who presents with:       ICD-10-CM    1. Alternating exotropia  H50.15    2. Hypertropia of right eye  H50.21          Plan  Mrs. Waddell is doing well on POD #1 s/p  PROCEDURES:   1.  Forced duction testing.  2.  Exploration, lysis of adhesions on previously operated bimedial rectus muscles.  3.  Excision of 6.5 mm stretch scar from the right medial rectus muscle and 1/2 tendon-width inferior transposition.  4.  Right lateral rectus recession of 4.0 millimeters with 1/2 tendon-width inferior transposition.  5.  Subconjunctival injections of Celestone, both eyes.  Call with increasing pain, lid edema and erythema, and discharge.  Tobradex ointment BID x 1 week.  F/u 2 months.     Further details of the management plan can be found in the \"Patient Instructions\" section which was printed and given to the patient at checkout.  No follow-ups on file.   Attending Physician Attestation:  Complete documentation of historical and exam elements from today's encounter can be found in the full encounter summary report (not reduplicated in this progress note).  I personally obtained the chief complaint(s) and history of present illness.  I confirmed and edited as necessary the review of systems, past medical/surgical history, family history, social history, and examination findings as documented by others; and I examined the patient myself.  I personally reviewed the relevant tests, images, and reports as documented above.  I formulated and edited as necessary the assessment and plan and discussed the findings and management " plan with the patient and family. - Julianne Melendez MD 6/7/2022 2:06 PM

## 2022-09-13 ENCOUNTER — OFFICE VISIT (OUTPATIENT)
Dept: OPHTHALMOLOGY | Facility: CLINIC | Age: 67
End: 2022-09-13
Attending: OPHTHALMOLOGY
Payer: MEDICARE

## 2022-09-13 DIAGNOSIS — H50.05 ALTERNATING ESOTROPIA: Primary | ICD-10-CM

## 2022-09-13 PROCEDURE — G0463 HOSPITAL OUTPT CLINIC VISIT: HCPCS | Performed by: TECHNICIAN/TECHNOLOGIST

## 2022-09-13 PROCEDURE — 92060 SENSORIMOTOR EXAMINATION: CPT | Performed by: OPHTHALMOLOGY

## 2022-09-13 PROCEDURE — 92012 INTRM OPH EXAM EST PATIENT: CPT | Performed by: OPHTHALMOLOGY

## 2022-09-13 ASSESSMENT — REFRACTION_WEARINGRX
OD_AXIS: 035
OD_CYLINDER: +0.75
SPECS_TYPE: PAL
OD_SPHERE: +0.50
OS_AXIS: 015
OS_SPHERE: PLANO
OS_CYLINDER: +0.50
OS_ADD: +3.00
OD_ADD: +3.00

## 2022-09-13 ASSESSMENT — VISUAL ACUITY
OS_CC+: -2
METHOD: SNELLEN - LINEAR
CORRECTION_TYPE: GLASSES
OD_CC: 20/20
OS_CC: 20/20

## 2022-09-13 NOTE — NURSING NOTE
Chief Complaint(s) and History of Present Illness(es)     Exotropia Follow Up     Laterality: both eyes    Treatments tried: glasses and surgery    Comments: No changes since 1 day post op, ET noticed, no diplopia, no Vision changes               Comments     Inf patient

## 2022-09-20 ENCOUNTER — TELEPHONE (OUTPATIENT)
Dept: OPHTHALMOLOGY | Facility: CLINIC | Age: 67
End: 2022-09-20

## 2022-09-20 ASSESSMENT — SLIT LAMP EXAM - LIDS
COMMENTS: NORMAL
COMMENTS: NORMAL

## 2022-09-20 ASSESSMENT — EXTERNAL EXAM - RIGHT EYE: OD_EXAM: NORMAL

## 2022-09-20 ASSESSMENT — EXTERNAL EXAM - LEFT EYE: OS_EXAM: NORMAL

## 2022-09-20 NOTE — TELEPHONE ENCOUNTER
9/20/2022 3:47PM Andie states that Dr. Melendez told her to call me to schedule her surgery if she hadn't heard from me in a week. Advised that Dr. Melendez has not yet requested surgery for Andie, but I will send her a message to enter the Case Request and, when I receive it, I will call back to schedule.

## 2022-09-21 NOTE — PROGRESS NOTES
"Chief Complaints and History of Present Illnesses   Patient presents with     Exotropia Follow Up     No changes since 1 day post op, ET noticed, no diplopia, no Vision changes    Review of systems for the eyes was negative other than the pertinent positives and negatives noted in the HPI.  History is obtained from the patient and     Referring provider: Gilbert Ragsdale     Primary care: Dayanna Garcia MD   Assessment   Andie Waddell is a 66 year old female who presents with:       ICD-10-CM    1. Alternating esotropia  H50.05 Sensorimotor         Plan  Andie thinks her alignment has improved from large XT but does notice small ET and wishes for further surgery.  I recommend eye muscle surgery. Today with Andie and her , I reviewed the indications, risks, benefits, and alternatives of eye muscle surgery including, but not limited to, failure obtain the desired ocular alignment (\"over\" or \"under\" correction), diplopia, and damage to any structure in or around the eye that may necessitate treatment with medicine, laser, or surgery. I further explained that the goal of surgery is to help control Andie's strabismus. Surgery will not \"cure\" Andie's strabismus or resolve/prevent the need for refractive correction. Additional strabismus surgery may be required in the short or long term. I emphasized that regular follow-up to monitor and optimize her vision and alignment would be necessary. We also discussed the risks of surgical injury, bleeding, and infection which may necessitate further medical or surgical treatment and which may result in diplopia, loss of vision, blindness, or loss of the eye(s) in less than 1% of cases and the remote possibility of permanent damage to any organ system or death with the use of general anesthesia.  I explained that we would hide visible scars as much as possible in natural creases but that every patient heals and pigments differently resulting in a variable degree " "of scarring to the eyes or surrounding facial structures after surgery.  I provided multiple opportunities for questions, answered all questions to the best of my ability, and confirmed that my answers and my discussion were understood.       Further details of the management plan can be found in the \"Patient Instructions\" section which was printed and given to the patient at checkout.  No follow-ups on file.   Attending Physician Attestation:  Complete documentation of historical and exam elements from today's encounter can be found in the full encounter summary report (not reduplicated in this progress note).  I personally obtained the chief complaint(s) and history of present illness.  I confirmed and edited as necessary the review of systems, past medical/surgical history, family history, social history, and examination findings as documented by others; and I examined the patient myself.  I personally reviewed the relevant tests, images, and reports as documented above.  I formulated and edited as necessary the assessment and plan and discussed the findings and management plan with the patient and family. - Julianne Melendez MD 9/20/2022 10:29 PM        "

## 2022-10-03 ENCOUNTER — HEALTH MAINTENANCE LETTER (OUTPATIENT)
Age: 67
End: 2022-10-03

## 2022-11-15 ENCOUNTER — ANESTHESIA EVENT (OUTPATIENT)
Dept: SURGERY | Facility: CLINIC | Age: 67
End: 2022-11-15
Payer: MEDICARE

## 2022-11-16 ENCOUNTER — HOSPITAL ENCOUNTER (OUTPATIENT)
Facility: CLINIC | Age: 67
Discharge: HOME | End: 2022-11-16
Attending: OPHTHALMOLOGY | Admitting: OPHTHALMOLOGY
Payer: MEDICARE

## 2022-11-16 ENCOUNTER — ANESTHESIA (OUTPATIENT)
Dept: SURGERY | Facility: CLINIC | Age: 67
End: 2022-11-16
Payer: MEDICARE

## 2022-11-16 VITALS
OXYGEN SATURATION: 96 % | HEART RATE: 69 BPM | HEIGHT: 64 IN | RESPIRATION RATE: 16 BRPM | BODY MASS INDEX: 26.16 KG/M2 | SYSTOLIC BLOOD PRESSURE: 131 MMHG | TEMPERATURE: 97.9 F | WEIGHT: 153.22 LBS | DIASTOLIC BLOOD PRESSURE: 67 MMHG

## 2022-11-16 DIAGNOSIS — Z98.890 POSTOPERATIVE EYE STATE: Primary | ICD-10-CM

## 2022-11-16 LAB — GLUCOSE BLDC GLUCOMTR-MCNC: 105 MG/DL (ref 70–99)

## 2022-11-16 PROCEDURE — 258N000003 HC RX IP 258 OP 636: Performed by: ANESTHESIOLOGY

## 2022-11-16 PROCEDURE — 360N000076 HC SURGERY LEVEL 3, PER MIN: Performed by: OPHTHALMOLOGY

## 2022-11-16 PROCEDURE — 67311 REVISE EYE MUSCLE: CPT | Mod: RT | Performed by: OPHTHALMOLOGY

## 2022-11-16 PROCEDURE — 999N000141 HC STATISTIC PRE-PROCEDURE NURSING ASSESSMENT: Performed by: OPHTHALMOLOGY

## 2022-11-16 PROCEDURE — 250N000025 HC SEVOFLURANE, PER MIN: Performed by: OPHTHALMOLOGY

## 2022-11-16 PROCEDURE — 250N000011 HC RX IP 250 OP 636: Performed by: NURSE ANESTHETIST, CERTIFIED REGISTERED

## 2022-11-16 PROCEDURE — 250N000009 HC RX 250: Performed by: NURSE ANESTHETIST, CERTIFIED REGISTERED

## 2022-11-16 PROCEDURE — 250N000009 HC RX 250: Performed by: OPHTHALMOLOGY

## 2022-11-16 PROCEDURE — 272N000001 HC OR GENERAL SUPPLY STERILE: Performed by: OPHTHALMOLOGY

## 2022-11-16 PROCEDURE — 250N000013 HC RX MED GY IP 250 OP 250 PS 637: Performed by: ANESTHESIOLOGY

## 2022-11-16 PROCEDURE — 710N000010 HC RECOVERY PHASE 1, LEVEL 2, PER MIN: Performed by: OPHTHALMOLOGY

## 2022-11-16 PROCEDURE — 250N000011 HC RX IP 250 OP 636: Performed by: OPHTHALMOLOGY

## 2022-11-16 PROCEDURE — 370N000017 HC ANESTHESIA TECHNICAL FEE, PER MIN: Performed by: OPHTHALMOLOGY

## 2022-11-16 PROCEDURE — 82962 GLUCOSE BLOOD TEST: CPT

## 2022-11-16 PROCEDURE — 710N000012 HC RECOVERY PHASE 2, PER MINUTE: Performed by: OPHTHALMOLOGY

## 2022-11-16 PROCEDURE — 250N000013 HC RX MED GY IP 250 OP 250 PS 637: Performed by: OPHTHALMOLOGY

## 2022-11-16 RX ORDER — FENTANYL CITRATE 50 UG/ML
25 INJECTION, SOLUTION INTRAMUSCULAR; INTRAVENOUS EVERY 5 MIN PRN
Status: DISCONTINUED | OUTPATIENT
Start: 2022-11-16 | End: 2022-11-16 | Stop reason: HOSPADM

## 2022-11-16 RX ORDER — BALANCED SALT SOLUTION 6.4; .75; .48; .3; 3.9; 1.7 MG/ML; MG/ML; MG/ML; MG/ML; MG/ML; MG/ML
SOLUTION OPHTHALMIC PRN
Status: DISCONTINUED | OUTPATIENT
Start: 2022-11-16 | End: 2022-11-16 | Stop reason: HOSPADM

## 2022-11-16 RX ORDER — EPHEDRINE SULFATE 50 MG/ML
INJECTION, SOLUTION INTRAMUSCULAR; INTRAVENOUS; SUBCUTANEOUS PRN
Status: DISCONTINUED | OUTPATIENT
Start: 2022-11-16 | End: 2022-11-16

## 2022-11-16 RX ORDER — OXYCODONE HYDROCHLORIDE 5 MG/1
5 TABLET ORAL ONCE
Status: COMPLETED | OUTPATIENT
Start: 2022-11-16 | End: 2022-11-16

## 2022-11-16 RX ORDER — ONDANSETRON 2 MG/ML
4 INJECTION INTRAMUSCULAR; INTRAVENOUS EVERY 30 MIN PRN
Status: DISCONTINUED | OUTPATIENT
Start: 2022-11-16 | End: 2022-11-16 | Stop reason: HOSPADM

## 2022-11-16 RX ORDER — ACETAMINOPHEN 325 MG/1
975 TABLET ORAL ONCE
Status: COMPLETED | OUTPATIENT
Start: 2022-11-16 | End: 2022-11-16

## 2022-11-16 RX ORDER — FENTANYL CITRATE 50 UG/ML
25 INJECTION, SOLUTION INTRAMUSCULAR; INTRAVENOUS
Status: DISCONTINUED | OUTPATIENT
Start: 2022-11-16 | End: 2022-11-16 | Stop reason: HOSPADM

## 2022-11-16 RX ORDER — METOPROLOL TARTRATE 1 MG/ML
1-2 INJECTION, SOLUTION INTRAVENOUS EVERY 5 MIN PRN
Status: DISCONTINUED | OUTPATIENT
Start: 2022-11-16 | End: 2022-11-16 | Stop reason: HOSPADM

## 2022-11-16 RX ORDER — PROPOFOL 10 MG/ML
INJECTION, EMULSION INTRAVENOUS PRN
Status: DISCONTINUED | OUTPATIENT
Start: 2022-11-16 | End: 2022-11-16

## 2022-11-16 RX ORDER — ACETAMINOPHEN 325 MG/1
650 TABLET ORAL ONCE
Status: COMPLETED | OUTPATIENT
Start: 2022-11-16 | End: 2022-11-16

## 2022-11-16 RX ORDER — LIDOCAINE HYDROCHLORIDE 20 MG/ML
INJECTION, SOLUTION INFILTRATION; PERINEURAL PRN
Status: DISCONTINUED | OUTPATIENT
Start: 2022-11-16 | End: 2022-11-16

## 2022-11-16 RX ORDER — BETAMETHASONE SODIUM PHOSPHATE AND BETAMETHASONE ACETATE 3; 3 MG/ML; MG/ML
INJECTION, SUSPENSION INTRA-ARTICULAR; INTRALESIONAL; INTRAMUSCULAR; SOFT TISSUE PRN
Status: DISCONTINUED | OUTPATIENT
Start: 2022-11-16 | End: 2022-11-16 | Stop reason: HOSPADM

## 2022-11-16 RX ORDER — ONDANSETRON 4 MG/1
4 TABLET, ORALLY DISINTEGRATING ORAL EVERY 30 MIN PRN
Status: DISCONTINUED | OUTPATIENT
Start: 2022-11-16 | End: 2022-11-16 | Stop reason: HOSPADM

## 2022-11-16 RX ORDER — SODIUM CHLORIDE, SODIUM LACTATE, POTASSIUM CHLORIDE, CALCIUM CHLORIDE 600; 310; 30; 20 MG/100ML; MG/100ML; MG/100ML; MG/100ML
INJECTION, SOLUTION INTRAVENOUS CONTINUOUS
Status: DISCONTINUED | OUTPATIENT
Start: 2022-11-16 | End: 2022-11-16 | Stop reason: HOSPADM

## 2022-11-16 RX ORDER — NALOXONE HYDROCHLORIDE 0.4 MG/ML
0.2 INJECTION, SOLUTION INTRAMUSCULAR; INTRAVENOUS; SUBCUTANEOUS
Status: DISCONTINUED | OUTPATIENT
Start: 2022-11-16 | End: 2022-11-16 | Stop reason: HOSPADM

## 2022-11-16 RX ORDER — NALOXONE HYDROCHLORIDE 0.4 MG/ML
0.4 INJECTION, SOLUTION INTRAMUSCULAR; INTRAVENOUS; SUBCUTANEOUS
Status: DISCONTINUED | OUTPATIENT
Start: 2022-11-16 | End: 2022-11-16 | Stop reason: HOSPADM

## 2022-11-16 RX ORDER — FENTANYL CITRATE 50 UG/ML
50 INJECTION, SOLUTION INTRAMUSCULAR; INTRAVENOUS EVERY 5 MIN PRN
Status: DISCONTINUED | OUTPATIENT
Start: 2022-11-16 | End: 2022-11-16 | Stop reason: HOSPADM

## 2022-11-16 RX ORDER — LIDOCAINE 40 MG/G
CREAM TOPICAL
Status: DISCONTINUED | OUTPATIENT
Start: 2022-11-16 | End: 2022-11-16 | Stop reason: HOSPADM

## 2022-11-16 RX ORDER — GLYCOPYRROLATE 0.2 MG/ML
INJECTION, SOLUTION INTRAMUSCULAR; INTRAVENOUS PRN
Status: DISCONTINUED | OUTPATIENT
Start: 2022-11-16 | End: 2022-11-16

## 2022-11-16 RX ORDER — KETOROLAC TROMETHAMINE 30 MG/ML
INJECTION, SOLUTION INTRAMUSCULAR; INTRAVENOUS PRN
Status: DISCONTINUED | OUTPATIENT
Start: 2022-11-16 | End: 2022-11-16

## 2022-11-16 RX ORDER — ONDANSETRON 2 MG/ML
INJECTION INTRAMUSCULAR; INTRAVENOUS PRN
Status: DISCONTINUED | OUTPATIENT
Start: 2022-11-16 | End: 2022-11-16

## 2022-11-16 RX ORDER — OXYCODONE HYDROCHLORIDE 5 MG/1
5 TABLET ORAL EVERY 6 HOURS PRN
Qty: 6 TABLET | Refills: 0 | Status: SHIPPED | OUTPATIENT
Start: 2022-11-16 | End: 2022-11-19

## 2022-11-16 RX ORDER — DEXAMETHASONE SODIUM PHOSPHATE 4 MG/ML
INJECTION, SOLUTION INTRA-ARTICULAR; INTRALESIONAL; INTRAMUSCULAR; INTRAVENOUS; SOFT TISSUE PRN
Status: DISCONTINUED | OUTPATIENT
Start: 2022-11-16 | End: 2022-11-16

## 2022-11-16 RX ORDER — DEXAMETHASONE SODIUM PHOSPHATE 4 MG/ML
4 INJECTION, SOLUTION INTRA-ARTICULAR; INTRALESIONAL; INTRAMUSCULAR; INTRAVENOUS; SOFT TISSUE EVERY 10 MIN PRN
Status: DISCONTINUED | OUTPATIENT
Start: 2022-11-16 | End: 2022-11-16 | Stop reason: HOSPADM

## 2022-11-16 RX ORDER — MEPERIDINE HYDROCHLORIDE 25 MG/ML
12.5 INJECTION INTRAMUSCULAR; INTRAVENOUS; SUBCUTANEOUS
Status: DISCONTINUED | OUTPATIENT
Start: 2022-11-16 | End: 2022-11-16 | Stop reason: HOSPADM

## 2022-11-16 RX ORDER — FENTANYL CITRATE 50 UG/ML
INJECTION, SOLUTION INTRAMUSCULAR; INTRAVENOUS PRN
Status: DISCONTINUED | OUTPATIENT
Start: 2022-11-16 | End: 2022-11-16

## 2022-11-16 RX ORDER — FENTANYL CITRATE-0.9 % NACL/PF 10 MCG/ML
PLASTIC BAG, INJECTION (ML) INTRAVENOUS PRN
Status: DISCONTINUED | OUTPATIENT
Start: 2022-11-16 | End: 2022-11-16

## 2022-11-16 RX ADMIN — GLYCOPYRROLATE 0.2 MG: 0.2 INJECTION, SOLUTION INTRAMUSCULAR; INTRAVENOUS at 10:25

## 2022-11-16 RX ADMIN — DEXAMETHASONE SODIUM PHOSPHATE 8 MG: 4 INJECTION, SOLUTION INTRA-ARTICULAR; INTRALESIONAL; INTRAMUSCULAR; INTRAVENOUS; SOFT TISSUE at 10:16

## 2022-11-16 RX ADMIN — Medication 10 MG: at 10:29

## 2022-11-16 RX ADMIN — LIDOCAINE HYDROCHLORIDE 70 MG: 20 INJECTION, SOLUTION INFILTRATION; PERINEURAL at 10:16

## 2022-11-16 RX ADMIN — ACETAMINOPHEN 650 MG: 325 TABLET, FILM COATED ORAL at 14:01

## 2022-11-16 RX ADMIN — OXYCODONE HYDROCHLORIDE 5 MG: 5 TABLET ORAL at 13:07

## 2022-11-16 RX ADMIN — PROPOFOL 150 MG: 10 INJECTION, EMULSION INTRAVENOUS at 10:16

## 2022-11-16 RX ADMIN — GLYCOPYRROLATE 0.2 MG: 0.2 INJECTION, SOLUTION INTRAMUSCULAR; INTRAVENOUS at 10:30

## 2022-11-16 RX ADMIN — Medication 10 MG: at 10:26

## 2022-11-16 RX ADMIN — SODIUM CHLORIDE, POTASSIUM CHLORIDE, SODIUM LACTATE AND CALCIUM CHLORIDE: 600; 310; 30; 20 INJECTION, SOLUTION INTRAVENOUS at 10:08

## 2022-11-16 RX ADMIN — ONDANSETRON 4 MG: 2 INJECTION INTRAMUSCULAR; INTRAVENOUS at 10:53

## 2022-11-16 RX ADMIN — FENTANYL CITRATE 50 MCG: 50 INJECTION, SOLUTION INTRAMUSCULAR; INTRAVENOUS at 10:47

## 2022-11-16 RX ADMIN — FENTANYL CITRATE 50 MCG: 50 INJECTION, SOLUTION INTRAMUSCULAR; INTRAVENOUS at 10:16

## 2022-11-16 RX ADMIN — Medication 50 MCG: at 10:30

## 2022-11-16 RX ADMIN — Medication 5 MG: at 10:31

## 2022-11-16 RX ADMIN — PROPOFOL 100 MG: 10 INJECTION, EMULSION INTRAVENOUS at 10:18

## 2022-11-16 RX ADMIN — SODIUM CHLORIDE, POTASSIUM CHLORIDE, SODIUM LACTATE AND CALCIUM CHLORIDE: 600; 310; 30; 20 INJECTION, SOLUTION INTRAVENOUS at 11:46

## 2022-11-16 RX ADMIN — ACETAMINOPHEN 975 MG: 325 TABLET, FILM COATED ORAL at 09:02

## 2022-11-16 RX ADMIN — KETOROLAC TROMETHAMINE 30 MG: 30 INJECTION, SOLUTION INTRAMUSCULAR at 11:14

## 2022-11-16 ASSESSMENT — ENCOUNTER SYMPTOMS: SEIZURES: 1

## 2022-11-16 ASSESSMENT — ACTIVITIES OF DAILY LIVING (ADL)
ADLS_ACUITY_SCORE: 35

## 2022-11-16 ASSESSMENT — LIFESTYLE VARIABLES: TOBACCO_USE: 1

## 2022-11-16 NOTE — OR NURSING
Patient walked around unit x2 and using incentive spirometer.  O2 saturations improved to 96%.  Plan to discharge to home at this time.  Patient encouraged to use incentive spirometer at [facility].

## 2022-11-16 NOTE — ANESTHESIA CARE TRANSFER NOTE
Patient: Andie Waddell    Procedure: Procedure(s):  SIMPLE RECESSION OR RESECTION, MUSCLE, EXTRAOCULAR, BILATERAL, FOR STRABISMUS CORRECTION       Diagnosis: Alternating esotropia [H50.05]  Diagnosis Additional Information: No value filed.    Anesthesia Type:   General     Note:    Oropharynx: oropharynx clear of all foreign objects  Level of Consciousness: awake (confused initally)  Oxygen Supplementation: blow-by O2  Level of Supplemental Oxygen (L/min / FiO2): 4  Independent Airway: airway patency satisfactory and stable  Dentition: dentition unchanged  Vital Signs Stable: post-procedure vital signs reviewed and stable  Report to RN Given: handoff report given  Patient transferred to: PACU    Handoff Report: Identifed the Patient, Identified the Reponsible Provider, Reviewed the pertinent medical history, Discussed the surgical course, Reviewed Intra-OP anesthesia mangement and issues during anesthesia, Set expectations for post-procedure period and Allowed opportunity for questions and acknowledgement of understanding      Vitals:  Vitals Value Taken Time   /50 11/16/22 1137   Temp     Pulse 56 11/16/22 1137   Resp 22 11/16/22 1142   SpO2 87 % 11/16/22 1142   Vitals shown include unvalidated device data.    Electronically Signed By: PRICILA Cevallos CRNA  November 16, 2022  11:51 AM

## 2022-11-16 NOTE — DISCHARGE INSTRUCTIONS
Strabismus Repair Discharge Instructions    Dr. Julianne Melendez, Dr. Prashanth Pink, Dr. Homa Lawler      Your eye doctor performed surgery to help align your eye(s). During surgery, certain eye muscles are adjusted. This helps the muscles better control how the eye moves. Often, surgery is done in addition to other treatments.   How Surgery Works  Strabismus surgery is a safe, common operation. The doctor changes the placement or length of an eye muscle. This small change can pull the eye into proper alignment. The two most common methods of surgery are:  Recession, in which a muscle is moved to a new position on the eye.  Resection, in which a small section of an eye muscle is removed.  What to Expect  It is normal to experience the following:  Eye Redness. This will resolve slowly over 2- 4 weeks.  Pink tinged tears  Swollen, painful or itchy eyes  Sensitivity to bright lights.  Trouble opening eyes for 1-2 days.  Feeling dizzy or off balance until you get used to seeing differently.  After Surgery Care  Avoid rubbing your eyes.  You may use cool cloths to help with pain and/or itching.  Minimize direct contact with water for 1 week. Showering or bathing is allowed.  You may use a warm, wet washcloth to remove any dried drainage from the eye. Wipe eye from inner corner to the outer corner of the eye.   No swimming for 1 week.  Use sunglasses or a hat to protect eyes from bright lights if you are light sensitive.  You may wear glasses as soon as needed.  No heavy lifting or contact sports for 1 week.   Use eye drops or eye ointment as directed to prevent infection and decrease swelling. Avoid touching surface of eye with the tube or bottle.   Suture Care  Sutures will dissolve over several weeks; they will not need to be removed.   Adjustable sutures may have been placed during surgery. You may need to stay in the recovery room for a longer period after surgery before a possible suture adjustment is performed. Once the  suture is adjusted you will be sent home.   When to Call the Doctor   Eyelids are progressively getting more swollen and painful after 24 hours.  You see a dramatic change in the position of the eye over time.  Frequent or continuous vomiting.  Green or yellow drainage from the eye.  Temperature over 101 degrees.  If your child experiences worsening RSVP (Redness, Sensitivity to light, Vision, Pain), or develops fever or worsening discharge, call EITHER  [phone] (8am-4:30pm Monday-Friday)  [phone] (after hours & weekends)  and ask to speak with the Ophthalmology Resident or Fellow On-Call or return to the eye clinic or emergency room immediately.        If your unable to tolerate food and drink, vomits 3 times, or appears to have decreased alertness or lethargy, return to the emergency room immediately. These can be signs of delayed gastrointestinal wake-up after anesthesia and your child may need IV fluids to prevent dehydration.    Follow Up  Follow up with your doctor on November 17th as scheduled  Rev. 3/2018  Same-Day Surgery   Adult Discharge Orders & Instructions     For 24 hours after surgery:  Get plenty of rest.  A responsible adult must stay with you for at least 24 hours after you leave the hospital.   Pain medication can slow your reflexes. Do not drive or use heavy equipment.  If you have weakness or tingling, don't drive or use heavy equipment until this feeling goes away.  Mixing alcohol and pain medication can cause dizziness and slow your breathing. It can even be fatal. Do not drink alcohol while taking pain medication.  Avoid strenuous or risky activities.  Ask for help when climbing stairs.   You may feel lightheaded.  If so, sit for a few minutes before standing.  Have someone help you get up.   If you have nausea (feel sick to your stomach), drink only clear liquids such as apple juice, ginger ale, broth or 7-Up.  Rest may also help.  Be sure to drink enough fluids.  Move to a  regular diet as you feel able. Take pain medications with a small amount of solid food, such as toast or crackers, to avoid nausea.   A slight fever is normal. Call the doctor if your fever is over 100 F (37.7 C) (taken under the tongue) or lasts longer than 24 hours.  You may have a dry mouth, muscle aches, trouble sleeping or a sore throat.  These symptoms should go away after 24 hours.  Do not make important or legal decisions.   Pain Management:      1. Take pain medication (if prescribed) for pain as directed by your physician.        2. WARNING: If the pain medication you have been prescribed contains Tylenol  (acetaminophen), DO NOT take additional doses of Tylenol (acetaminophen).     Call your doctor for any of the followin.  Signs of infection (fever, growing tenderness at the surgery site, severe pain, a large amount of drainage or bleeding, foul-smelling drainage, redness, swelling).    2.  It has been over 8 to 10 hours since surgery and you are still not able to urinate (pee).    3.  Headache for over 24 hours.    4.  Numbness, tingling or weakness the day after surgery (if you had spinal anesthesia).  To contact a doctor, call Dr. Melendez's clinic Mon-Fri or:  '   [phone] and ask for the Resident On Call for:          Ophthalmology (answered 24 hours a day)  '   Emergency Department:  [place] [facility]: [phone]  [place] [facility]: [phone]               Rev. 10/2014

## 2022-11-16 NOTE — OR NURSING
Patient's oxygen saturations periodically drop to 89-90%.  Patient deep breathing and using incentive spirometer to help increase oxygen levels.  Will continue to monitor.

## 2022-11-16 NOTE — ANESTHESIA POSTPROCEDURE EVALUATION
Patient: Andie Waddell    Procedure: Procedure(s):  SIMPLE RECESSION OR RESECTION, MUSCLE, EXTRAOCULAR, BILATERAL, FOR STRABISMUS CORRECTION       Anesthesia Type:  General    Note:  Disposition: Outpatient   Postop Pain Control: Uneventful            Sign Out: Well controlled pain   PONV: No   Neuro/Psych: Uneventful            Sign Out: Acceptable/Baseline neuro status   Airway/Respiratory: Uneventful            Sign Out: Acceptable/Baseline resp. status   CV/Hemodynamics: Uneventful            Sign Out: Acceptable CV status; No obvious hypovolemia; No obvious fluid overload   Other NRE: NONE   DID A NON-ROUTINE EVENT OCCUR? No           Last vitals:  Vitals Value Taken Time   /68 11/16/22 1215   Temp 36.8  C (98.2  F) 11/16/22 1215   Pulse 74 11/16/22 1223   Resp 17 11/16/22 1224   SpO2 91 % 11/16/22 1229   Vitals shown include unvalidated device data.    Electronically Signed By: Gilbert Topete MD  November 16, 2022  12:46 PM

## 2022-11-16 NOTE — ANESTHESIA PROCEDURE NOTES
Airway       Patient location during procedure: OR       Procedure Start/Stop Times: 11/16/2022 10:18 AM  Staff -        CRNA: David Dalton APRN CRNA       Performed By: CRNA  Consent for Airway        Urgency: elective  Indications and Patient Condition       Indications for airway management: arthur-procedural       Induction type:intravenous       Mask difficulty assessment: 0 - not attempted    Final Airway Details       Final airway type: supraglottic airway    Supraglottic Airway Details        Type: LMA       Brand: Ambu AuraGain       LMA size: 4    Post intubation assessment        Placement verified by: capnometry, equal breath sounds and chest rise        Number of attempts at approach: 2       Number of other approaches attempted: 0       Secured with: silk tape       Ease of procedure: easy       Dentition: Unchanged and Intact    Medication(s) Administered   Medication Administration Time: 11/16/2022 10:18 AM

## 2022-11-16 NOTE — INTERVAL H&P NOTE
"I have reviewed the surgical (or preoperative) H&P that is linked to this encounter, and examined the patient. There are no significant changes    Clinical Conditions Present on Arrival:  Clinically Significant Risk Factors Present on Admission                    # Overweight: Estimated body mass index is 26.3 kg/m  as calculated from the following:    Height as of this encounter: 1.626 m (5' 4\").    Weight as of this encounter: 69.5 kg (153 lb 3.5 oz).       "

## 2022-11-16 NOTE — ANESTHESIA PREPROCEDURE EVALUATION
Anesthesia Pre-Procedure Evaluation    Patient: Andie Waddell   MRN: [id number] : 1955        Procedure : Procedure(s):  SIMPLE RECESSION OR RESECTION, MUSCLE, EXTRAOCULAR, BILATERAL, FOR STRABISMUS CORRECTION          Past Medical History:   Diagnosis Date     Exotropia, alternating       Past Surgical History:   Procedure Laterality Date     RECESSION RESECTION (REPAIR STRABISMUS) BILATERAL Bilateral 2022    Procedure: Bilateral Strabismus repair;  Surgeon: Julianne Melendez MD;  Location: [facility] OR     STRABISMUS SURGERY Bilateral     Esotropia, early  in [place], [place]      Allergies   Allergen Reactions     Codeine Nausea and Vomiting and Other (See Comments)     Unconsciousness       Social History     Tobacco Use     Smoking status: Former     Smokeless tobacco: Never   Substance Use Topics     Alcohol use: Not Currently      Wt Readings from Last 1 Encounters:   22 69.5 kg (153 lb 3.5 oz)        Anesthesia Evaluation   Pt has had prior anesthetic. Type: General.        ROS/MED HX  ENT/Pulmonary:     (+) sleep apnea, mild, doesn't use CPAP, tobacco use, Past use,     Neurologic: Comment: Mild early dementia    (+) dementia, peripheral neuropathy, - Small fiber neuropathy. seizures, last seizure: One seizure in past.  No treatment since.,     Cardiovascular:  - neg cardiovascular ROS   (+) Dyslipidemia -----    METS/Exercise Tolerance:     Hematologic:  - neg hematologic  ROS     Musculoskeletal:  - neg musculoskeletal ROS     GI/Hepatic:     (+) GERD, Asymptomatic on medication,     Renal/Genitourinary:  - neg Renal ROS     Endo:  - neg endo ROS     Psychiatric/Substance Use: Comment: Quit drinking in 2017    (+) alcohol abuse     Infectious Disease:  - neg infectious disease ROS     Malignancy:   (+) Malignancy, History of GI.GI CA  Remission status post Surgery.        Other:  - neg other ROS          Physical Exam    Airway  airway exam normal           Respiratory Devices and  Support         Dental  no notable dental history         Cardiovascular   cardiovascular exam normal          Pulmonary   pulmonary exam normal                OUTSIDE LABS:  CBC: No results found for: WBC, HGB, HCT, PLT  BMP:   Lab Results   Component Value Date     (H) 11/16/2022     (H) 05/25/2022     COAGS: No results found for: PTT, INR, FIBR  POC: No results found for: BGM, HCG, HCGS  HEPATIC: No results found for: ALBUMIN, PROTTOTAL, ALT, AST, GGT, ALKPHOS, BILITOTAL, BILIDIRECT, LAURA  OTHER: No results found for: PH, LACT, A1C, BALWINDER, PHOS, MAG, LIPASE, AMYLASE, TSH, T4, T3, CRP, SED    Anesthesia Plan    ASA Status:  3   NPO Status:  NPO Appropriate    Anesthesia Type: General.     - Airway: LMA   Induction: Intravenous, Propofol.   Maintenance: Balanced.        Consents    Anesthesia Plan(s) and associated risks, benefits, and realistic alternatives discussed. Questions answered and patient/representative(s) expressed understanding.    - Discussed:     - Discussed with:  Patient, Spouse      - Extended Intubation/Ventilatory Support Discussed: No.      - Patient is DNR/DNI Status: No    Use of blood products discussed: No .     Postoperative Care    Pain management: IV analgesics, Oral pain medications.   PONV prophylaxis: Ondansetron (or other 5HT-3), Dexamethasone or Solumedrol, Background Propofol Infusion     Comments:                Gilbert Topete MD

## 2022-11-17 ENCOUNTER — OFFICE VISIT (OUTPATIENT)
Dept: OPHTHALMOLOGY | Facility: CLINIC | Age: 67
End: 2022-11-17
Attending: OPHTHALMOLOGY
Payer: MEDICARE

## 2022-11-17 DIAGNOSIS — H50.15 ALTERNATING EXOTROPIA: Primary | ICD-10-CM

## 2022-11-17 PROCEDURE — 99024 POSTOP FOLLOW-UP VISIT: CPT | Performed by: OPHTHALMOLOGY

## 2022-11-17 PROCEDURE — G0463 HOSPITAL OUTPT CLINIC VISIT: HCPCS

## 2022-11-17 ASSESSMENT — VISUAL ACUITY
METHOD: SNELLEN - LINEAR
OD_CC: 20/25
OS_CC: 20/20
CORRECTION_TYPE: GLASSES

## 2022-11-17 ASSESSMENT — REFRACTION_WEARINGRX
SPECS_TYPE: PAL
OD_SPHERE: +0.50
OS_ADD: +3.00
OD_AXIS: 035
OS_CYLINDER: +0.50
OS_SPHERE: PLANO
OD_CYLINDER: +0.75
OD_ADD: +3.00
OS_AXIS: 015

## 2022-11-17 NOTE — NURSING NOTE
Chief Complaint(s) and History of Present Illness(es)     Post Op (Ophthalmology) Right Eye            Laterality: right eye    Associated symptoms: Negative for double vision    Comments: No pain, just a slight ache.  No diplopia

## 2022-11-17 NOTE — NURSING NOTE
Chief Complaint(s) and History of Present Illness(es)     Post Op (Ophthalmology) Right Eye            Laterality: right eye    Associated symptoms: Negative for double vision    Comments: No pain, just a slight ache.  No diplopia  Has not started the ointment yet

## 2022-12-01 NOTE — OP NOTE
Procedure Date: 11/16/2022    PREOPERATIVE DIAGNOSES:    1.  Esotropia.  2.  Status post exploration, lysis of adhesions on previously operated bimedial rectus muscles with scar excision of the right medial rectus on 5/21/22.    POSTOPERATIVE DIAGNOSES:    1.  Esotropia.  2.  Status post exploration, lysis of adhesions on previously operated bimedial rectus muscles with scar excision of the right medial rectus on 5/21/22.    PROCEDURES:     1.  Forced duction testing.  2.  Exploration, lysis of adhesions on previously operated right lateral rectus muscle.  3.  Resection of 2 mm right lateral rectus with advancement to the original insertion (4.0 mm advancement).    SURGEON:  Julianne Melendez M.D.    ANESTHESIA:  General.    ESTIMATED BLOOD LOSS:  1 mL    COMPLICATIONS:  None.    INDICATIONS FOR PROCEDURE:  Andie is a 66-year-old woman who has large consecutive exotropia, which was repaired.  She still noted a small right esotropia and desired further correction.  The risks, benefits and alternatives to strabismus repair to restore her binocular alignment were discussed including but not limited to infection, bleeding, loss of vision, over or under correction of her alignment, poor cosmesis and need for further surgery.  She elected to proceed.    DESCRIPTION OF PROCEDURE:  After informed consent was obtained, Mrs. Waddell was taken to the operating room where general anesthesia was induced without complication.  She was prepped and draped in sterile ophthalmic fashion.  A timeout was performed.  Lid speculae were placed in both eyes and forced duction testing was performed.  There was no tightness in any direction.  Lid speculum was removed from the left eye and an occluder was placed.  5-0 silk traction sutures were placed at 6 and 12 o'clock of the right eye and the eye was placed in the adducted position.  Temporal conjunctival peritomy was performed.  The inferotemporal and superotemporal quadrants were dissected.   The right lateral rectus muscle was hooked, using small and Rodrigo hooks.  The conjunctiva was carefully thinned and the scar tissue was lysed, as this was a previously operated muscle.  A 6-0 double-armed Vicryl suture was used to imbricate the muscle 2 mm posterior to the original insertion using central and peripheral locking bites.  The muscle was disinserted from the globe.  Cautery was used for hemostasis.  The muscle had been measured at 4.0 mm posterior to the original insertion.  Scleral passes were then performed at the original insertion essentially advancing the muscle 4.0 mm and resecting 2.0 mm.  An 8-0 Vicryl suture was used to repair the conjunctiva.  Mrs. Waddell tolerated the procedure well and went to the recovery room in stable condition.  She will follow up postoperative day #1 in the eye clinic.    Julianne Melendez MD        D: 2022   T: 2022   MT: CAMI    Name:     GALINA WADDELL  MRN:      [id number]        Account:        [id number]   :      1955           Procedure Date: 2022     Document: [id number]

## 2022-12-12 NOTE — PROGRESS NOTES
"Chief Complaints and History of Present Illnesses   Patient presents with     Post Op (Ophthalmology) Right Eye     No pain, just a slight ache.  No diplopia  Has not started the ointment yet    Review of systems for the eyes was negative other than the pertinent positives and negatives noted in the HPI.  History is obtained from the patient and .    Referring provider: Referred Self     Primary care: Dayanna Garcia MD   Assessment   Andie Waddell is a 66 year old female who presents with:       ICD-10-CM    1. Alternating exotropia  H50.15             Plan  Mrs. Waddell is doing well on POD #1 s/p  1.  Forced duction testing.  2.  Exploration, lysis of adhesions on previously operated right lateral rectus muscle.  3.  Resection of 2 mm right lateral rectus with advancement to the original insertion (4.0 mm advancement).  Hubert use Tobradex ointment BID x 1 week.  Call with increasing pain, lid edema and erythema, and discharge.  F/u 3 months.     Further details of the management plan can be found in the \"Patient Instructions\" section which was printed and given to the patient at checkout.  Return in about 3 months (around 2/17/2023) for an exam with Dr. Melendez.   Attending Physician Attestation:  Complete documentation of historical and exam elements from today's encounter can be found in the full encounter summary report (not reduplicated in this progress note).  I personally obtained the chief complaint(s) and history of present illness.  I confirmed and edited as necessary the review of systems, past medical/surgical history, family history, social history, and examination findings as documented by others; and I examined the patient myself.  I personally reviewed the relevant tests, images, and reports as documented above.  I formulated and edited as necessary the assessment and plan and discussed the findings and management plan with the patient and family. - Julianne Melendez MD 12/12/2022 5:48 PM "

## 2023-05-21 ENCOUNTER — HEALTH MAINTENANCE LETTER (OUTPATIENT)
Age: 68
End: 2023-05-21

## 2024-05-19 ENCOUNTER — HEALTH MAINTENANCE LETTER (OUTPATIENT)
Age: 69
End: 2024-05-19

## 2024-07-09 ENCOUNTER — TELEPHONE (OUTPATIENT)
Dept: OPHTHALMOLOGY | Facility: CLINIC | Age: 69
End: 2024-07-09
Payer: MEDICARE

## 2024-07-09 NOTE — TELEPHONE ENCOUNTER
[facility] Call Center    Phone Message    May a detailed message be left on voicemail: yes     Reason for Call: Other: Patient is requesting to speak with Dr. Melendez's team about scheduling both a consultation and surgery appointment on back to back days.  She is asking for a call to [phone].  Thank you!      Action Taken: Message routed to:  [facility] ([facility] Eye     Travel Screening: Not Applicable     Date of Service:

## 2024-07-28 ENCOUNTER — HEALTH MAINTENANCE LETTER (OUTPATIENT)
Age: 69
End: 2024-07-28

## 2024-07-30 NOTE — TELEPHONE ENCOUNTER
Patient calling back to see if Dr Melendez or a care team member can reach out to her .  She is thinking that she is in need of another surgery on her eyes and was wondering is she needed a consultation first.  Please call to discuss.

## 2024-08-01 NOTE — TELEPHONE ENCOUNTER
Spoke with patient about request for consultation with surgery set up next day. Since patient was last seen on 11/17/2022 for a 1-day post op appointment and did not return for recommended follow up and this would be her fourth eye surgery, recommended that she schedule just a consultation first. Dr. Melendez will want to do consultation before considering another surgery. Scheduled an appointment for 10/8.    Melanie Jeans, Ophthalmic Assistant

## 2024-11-26 ENCOUNTER — OFFICE VISIT (OUTPATIENT)
Dept: OPHTHALMOLOGY | Facility: CLINIC | Age: 69
End: 2024-11-26
Attending: OPHTHALMOLOGY
Payer: MEDICARE

## 2024-11-26 ENCOUNTER — OFFICE VISIT (OUTPATIENT)
Dept: OPHTHALMOLOGY | Facility: CLINIC | Age: 69
End: 2024-11-26
Payer: MEDICARE

## 2024-11-26 DIAGNOSIS — H02.403 INVOLUTIONAL PTOSIS, ACQUIRED, BILATERAL: Primary | ICD-10-CM

## 2024-11-26 DIAGNOSIS — H02.413 MECHANICAL PTOSIS OF EYELID OF BOTH EYES: ICD-10-CM

## 2024-11-26 DIAGNOSIS — H50.10 MONOCULAR EXOTROPIA: ICD-10-CM

## 2024-11-26 DIAGNOSIS — H50.10 MONOCULAR EXOTROPIA: Primary | ICD-10-CM

## 2024-11-26 PROCEDURE — 99214 OFFICE O/P EST MOD 30 MIN: CPT | Mod: GC | Performed by: OPHTHALMOLOGY

## 2024-11-26 PROCEDURE — 92014 COMPRE OPH EXAM EST PT 1/>: CPT | Performed by: OPHTHALMOLOGY

## 2024-11-26 PROCEDURE — 92081 LIMITED VISUAL FIELD XM: CPT | Mod: GC | Performed by: OPHTHALMOLOGY

## 2024-11-26 PROCEDURE — 92015 DETERMINE REFRACTIVE STATE: CPT | Mod: GY

## 2024-11-26 PROCEDURE — 92285 EXTERNAL OCULAR PHOTOGRAPHY: CPT | Mod: GC | Performed by: OPHTHALMOLOGY

## 2024-11-26 PROCEDURE — 92060 SENSORIMOTOR EXAMINATION: CPT | Performed by: OPHTHALMOLOGY

## 2024-11-26 ASSESSMENT — CONF VISUAL FIELD
OS_INFERIOR_TEMPORAL_RESTRICTION: 0
OD_SUPERIOR_NASAL_RESTRICTION: 0
OS_INFERIOR_NASAL_RESTRICTION: 0
OD_SUPERIOR_NASAL_RESTRICTION: 0
OS_NORMAL: 1
OS_NORMAL: 1
OD_INFERIOR_TEMPORAL_RESTRICTION: 0
OS_INFERIOR_NASAL_RESTRICTION: 0
OD_INFERIOR_NASAL_RESTRICTION: 0
OS_SUPERIOR_NASAL_RESTRICTION: 0
OD_SUPERIOR_TEMPORAL_RESTRICTION: 0
OS_SUPERIOR_TEMPORAL_RESTRICTION: 0
OD_SUPERIOR_TEMPORAL_RESTRICTION: 0
OD_INFERIOR_TEMPORAL_RESTRICTION: 0
OS_SUPERIOR_TEMPORAL_RESTRICTION: 0
OD_NORMAL: 1
OD_INFERIOR_NASAL_RESTRICTION: 0
OD_NORMAL: 1
OS_INFERIOR_TEMPORAL_RESTRICTION: 0
OS_SUPERIOR_NASAL_RESTRICTION: 0

## 2024-11-26 ASSESSMENT — REFRACTION_MANIFEST
OS_CYLINDER: +0.50
OD_AXIS: 055
OD_ADD: +3.00
OD_SPHERE: +0.50
OD_CYLINDER: +0.75
OS_AXIS: 090
OS_SPHERE: -1.00
OS_ADD: +3.00

## 2024-11-26 ASSESSMENT — REFRACTION
OS_AXIS: 090
OD_CYLINDER: +0.50
OS_CYLINDER: +0.50
OS_SPHERE: -1.00
OD_AXIS: 055
OD_SPHERE: +0.50

## 2024-11-26 ASSESSMENT — REFRACTION_WEARINGRX
OD_VPRISM: 1 BD
OS_CYLINDER: +0.50
OS_ADD: +3.00
OD_CYLINDER: +1.25
OS_SPHERE: -1.00
OD_AXIS: 055
OD_SPHERE: +0.75
OS_AXIS: 080
SPECS_TYPE: PAL
OD_ADD: +3.00

## 2024-11-26 ASSESSMENT — VISUAL ACUITY
OS_CC+: -1
OS_CC+: -1
METHOD: SNELLEN - LINEAR
OS_CC: 20/20
CORRECTION_TYPE: GLASSES
OD_CC: 20/20
OD_CC: 20/20
CORRECTION_TYPE: GLASSES
METHOD: SNELLEN - LINEAR
OS_CC: 20/20

## 2024-11-26 ASSESSMENT — TONOMETRY
IOP_METHOD: ICARE
IOP_METHOD: ICARE
OD_IOP_MMHG: 9
OS_IOP_MMHG: 11
OS_IOP_MMHG: 11
OD_IOP_MMHG: 09

## 2024-11-26 ASSESSMENT — SLIT LAMP EXAM - LIDS
COMMENTS: PTOSIS
COMMENTS: PTOSIS

## 2024-11-26 ASSESSMENT — EXTERNAL EXAM - RIGHT EYE: OD_EXAM: BROW PTOSIS

## 2024-11-26 NOTE — NURSING NOTE
Chief Complaints and History of Present Illnesses   Patient presents with    Droopy Both Upper Lids     Andie Waddell is being seen for a consult today by the request of Dr. Melendez for ptosis eval.       Chief Complaint(s) and History of Present Illness(es)       Droopy Both Upper Lids              Laterality: right upper lid and left upper lid    Comments: Andie Waddell is being seen for a consult today by the request of Dr. Melendez for ptosis eval.                Comments    Patient has noticed XT for the past 8 months, dependent on which eye she is fixating. Patient reports intermittent horizontal double vision at near. Diplopia started last summer, very sporadic. Monocular lid closure resolves double. Patient believes that she is not seeing as well out of the LE and tends to close.     11/16/22:   Exploration, lysis of adhesions on previously operated right lateral rectus muscle.  Resection of 2 mm right lateral rectus with advancement to the original insertion (4.0 mm advancement).     s/p strabismus repair (5/25/22).     Exam note transferred from earlier visit with dr. lelo Aparicio COT November 26, 2024 1:53 PM

## 2024-11-26 NOTE — NURSING NOTE
Chief Complaint(s) and History of Present Illness(es)       Strabismus Evaluation              Associated symptoms: Negative for unequal pupil size, eye pain and blurred vision    Treatments tried: glasses and surgery    Comments: Patient has noticed XT for the past 8 months, dependent on which eye she is fixating. Patient reports intermittent horizontal double vision at near. Diplopia started last summer, very sporadic. Monocular lid closure resolves double. Patient believes that she is not seeing as well out of the LE and tends to close.               Comments    11/16/22:   Exploration, lysis of adhesions on previously operated right lateral rectus muscle.  Resection of 2 mm right lateral rectus with advancement to the original insertion (4.0 mm advancement).    s/p strabismus repair (5/25/22).

## 2024-11-26 NOTE — LETTER
2024         RE:  :  MRN: Andie Waddell  1955  [id number]     Dear Dr. Julianne Melendez,    Thank you for asking me to see your patient, Andie Waddell, for an oculoplastic   consultation.  My assessment and plan are below.  For further details, please see my attached clinic note.           HPI:     Chief Complaint(s) and History of Present Illness(es)     Droopy Both Upper Lids            Laterality: right upper lid and left upper lid    Comments: Andie Waddell is being seen for a consult today by the   request of Dr. Melendez for ptosis eval.            Comments    Patient has noticed XT for the past 8 months, dependent on which eye she   is fixating. Patient reports intermittent horizontal double vision at   near. Diplopia started last summer, very sporadic. Monocular lid closure   resolves double. Patient believes that she is not seeing as well out of   the LE and tends to close.     22:   Exploration, lysis of adhesions on previously operated right lateral   rectus muscle.  Resection of 2 mm right lateral rectus with advancement to the original   insertion (4.0 mm advancement).     s/p strabismus repair (22).     Exam note transferred from earlier visit with dr. lelo Aparicio COT 2024 1:53 PM      Patient states that she began to notice drooping eyelids over the course of several years.                  Andie Waddell is a 68 year old female who has noted gradual onset of droopy eyelids over the past years. The droopy eyelid is interfering with activities of daily living including driving, and reading. She denies variability of the eyelid position. Her eyes are dry and she uses artificial tears once per day three times per week. She had double vision over the summer but states that has since resolved.     EXAM:     MRD1: Right eye 1mm Left eye 1mm  MRD2: Right eye 4mm Left eye 4mm  Levator function: Right eye 10mm Left eye 10mm  Aponeurotic ptosis    Brow ptosis with brow resting below superior orbital rim (left eye > right eye)    VISUAL FIELD:  Right eye untaped:15 degrees Right eye taped:40 degrees  Left eye untaped:15 degrees Left eye taped:40 degrees      Assessment & Plan     Andie Waddell is a 68 year old female with the following diagnoses:   1. Involutional ptosis, acquired, bilateral    2. Monocular exotropia       Plan:  Bilateral upper eyelid ptosis repair (posterior approach 8+1 mm superior tarsectomy) [id number] They would like to combine with strabismus surgery with Dr. Melendez.      The heavy upper eyelids are causing symptoms as documented above. The condition is not secondary to underlying Sjogren's, myasthenia gravis, or polymyositis.   ANTICOAGULATION:  No anticoagulation or aspirin. No hx of heart attack or stroke. Does not smoke.         Again, thank you for allowing me to participate in the care of your patient.      Sincerely,    Girish Cabrera MD  Department of Ophthalmology and Visual Neurosciences  [facility]    CC: Julianne Melendez MD  [address] [place] [address] [address] [address]  Via In Basket

## 2024-11-26 NOTE — PROGRESS NOTES
Oculoplastic Clinic New Patient    Patient: Andie Waddell MRN# [id number]   YOB: 1955 Age: 68 year old   Date of Visit: Nov 26, 2024    CC: Droopy eyelids obstructing vision.              HPI:     Chief Complaint(s) and History of Present Illness(es)     Droopy Both Upper Lids            Laterality: right upper lid and left upper lid    Comments: Andie Waddell is being seen for a consult today by the   request of Dr. Melendez for ptosis eval.            Comments    Patient has noticed XT for the past 8 months, dependent on which eye she   is fixating. Patient reports intermittent horizontal double vision at   near. Diplopia started last summer, very sporadic. Monocular lid closure   resolves double. Patient believes that she is not seeing as well out of   the LE and tends to close.     11/16/22:   Exploration, lysis of adhesions on previously operated right lateral   rectus muscle.  Resection of 2 mm right lateral rectus with advancement to the original   insertion (4.0 mm advancement).     s/p strabismus repair (5/25/22).     Exam note transferred from earlier visit with dr. lelo Aparicio COT November 26, 2024 1:53 PM      Patient states that she began to notice drooping eyelids over the course of several years.                  Andie Waddell is a 68 year old female who has noted gradual onset of droopy eyelids over the past years. The droopy eyelid is interfering with activities of daily living including driving, and reading. She denies variability of the eyelid position. Her eyes are dry and she uses artificial tears once per day three times per week. She had double vision over the summer but states that has since resolved.     EXAM:     MRD1: Right eye 1mm Left eye 1mm  MRD2: Right eye 4mm Left eye 4mm  Levator function: Right eye 10mm Left eye 10mm  Aponeurotic ptosis   Brow ptosis with brow resting below superior orbital rim (left eye > right eye)    VISUAL FIELD:  Right eye  untaped:15 degrees Right eye taped:40 degrees  Left eye untaped:15 degrees Left eye taped:40 degrees      Assessment & Plan     Andie Waddell is a 68 year old female with the following diagnoses:   1. Involutional ptosis, acquired, bilateral    2. Monocular exotropia       Plan:  Bilateral upper eyelid ptosis repair (posterior approach 8+1 mm superior tarsectomy) [id number] They would like to combine with strabismus surgery with Dr. Melendez.      The heavy upper eyelids are causing symptoms as documented above. The condition is not secondary to underlying Sjogren's, myasthenia gravis, or polymyositis.   ANTICOAGULATION:  No anticoagulation or aspirin. No hx of heart attack or stroke. Does not smoke.          PHOTOS DEMONSTRATE:  Blepharoptosis  Brow ptosis with thicker brow skin and hairs below the lateral superior orbital rim    Attending Physician Attestation: Complete documentation of historical and exam elements from today's encounter can be found in the full encounter summary report (not reduplicated in this progress note). I personally obtained the chief complaint(s) and history of present illness. I confirmed and edited as necessary the review of systems, past medical/surgical history, family history, social history, and examination findings as documented by others; and I examined the patient myself. I personally reviewed the relevant tests, images, and reports as documented above. I formulated and edited as necessary the assessment and plan and discussed the findings and management plan with the patient. Girish Cabrera MD      Today with Andie Waddell I reviewed the indications, risks, benefits, and alternatives of the proposed surgical procedure including, but not limited to, failure obtain the desired result  and need for additional surgery, bleeding, infection, loss of vision, or injury to the eye, dry eyes, and the remote possibility of permanent damage to any organ system or death with the use of  anesthesia.  I provided multiple opportunities for the questions, answered all questions to the best of my ability, and confirmed that my answers and my discussion were understood.

## 2024-11-27 ASSESSMENT — EXTERNAL EXAM - RIGHT EYE: OD_EXAM: BROW PTOSIS

## 2024-11-27 ASSESSMENT — CUP TO DISC RATIO
OD_RATIO: 0.3
OS_RATIO: 0.3

## 2024-11-27 ASSESSMENT — SLIT LAMP EXAM - LIDS
COMMENTS: PTOSIS
COMMENTS: PTOSIS

## 2024-11-27 NOTE — PROGRESS NOTES
"Chief Complaints and History of Present Illnesses   Patient presents with    Strabismus Evaluation     Patient has noticed XT for the past 8 months, dependent on which eye she is fixating. Patient reports intermittent horizontal double vision at near. Diplopia started last summer, very sporadic. Monocular lid closure resolves double. Patient believes that she is not seeing as well out of the LE and tends to close. Usually prefers her LE.   Review of systems for the eyes was negative other than the pertinent positives and negatives noted in the HPI.  History is obtained from the patient and .    Referring provider: Referred Self     Primary care: Dayanna Garcia MD   Assessment   Andie Waddell is a 68 year old female who presents with:       ICD-10-CM    1. Monocular exotropia  H50.10 Sensorimotor      2. Mechanical ptosis of eyelid of both eyes  H02.413             Plan  Andie has recurrent exotropia but also has significant ptosis/brow ptosis.  Will consult oculoplastics for possible combined ptosis/strabismus repair.  I recommend eye muscle surgery. Today with Andie and her , I reviewed the indications, risks, benefits, and alternatives of eye muscle surgery including, but not limited to, failure obtain the desired ocular alignment (\"over\" or \"under\" correction), diplopia, and damage to any structure in or around the eye that may necessitate treatment with medicine, laser, or surgery. I further explained that the goal of surgery is to help control Andei's strabismus. Surgery will not \"cure\" Andie's strabismus or resolve/prevent the need for refractive correction. Additional strabismus surgery may be required in the short or long term. I emphasized that regular follow-up to monitor and optimize her vision and alignment would be necessary. We also discussed the risks of surgical injury, bleeding, and infection which may necessitate further medical or surgical treatment and which may result in " "diplopia, loss of vision, blindness, or loss of the eye(s) in less than 1% of cases and the remote possibility of permanent damage to any organ system or death with the use of general anesthesia.  I explained that we would hide visible scars as much as possible in natural creases but that every patient heals and pigments differently resulting in a variable degree of scarring to the eyes or surrounding facial structures after surgery.  I provided multiple opportunities for questions, answered all questions to the best of my ability, and confirmed that my answers and my discussion were understood.        Further details of the management plan can be found in the \"Patient Instructions\" section which was printed and given to the patient at checkout.  No follow-ups on file.   Attending Physician Attestation:  Complete documentation of historical and exam elements from today's encounter can be found in the full encounter summary report (not reduplicated in this progress note).  I personally obtained the chief complaint(s) and history of present illness.  I confirmed and edited as necessary the review of systems, past medical/surgical history, family history, social history, and examination findings as documented by others; and I examined the patient myself.  I personally reviewed the relevant tests, images, and reports as documented above.  I formulated and edited as necessary the assessment and plan and discussed the findings and management plan with the patient and family. - Julianne Melendez MD 11/27/2024 12:47 PM          "

## 2025-05-20 ENCOUNTER — ANESTHESIA EVENT (OUTPATIENT)
Dept: SURGERY | Facility: CLINIC | Age: 70
End: 2025-05-20
Payer: MEDICARE

## 2025-05-20 ENCOUNTER — OFFICE VISIT (OUTPATIENT)
Dept: OPHTHALMOLOGY | Facility: CLINIC | Age: 70
End: 2025-05-20
Attending: OPHTHALMOLOGY
Payer: MEDICARE

## 2025-05-20 DIAGNOSIS — H50.10 MONOCULAR EXOTROPIA: Primary | ICD-10-CM

## 2025-05-20 PROCEDURE — 92060 SENSORIMOTOR EXAMINATION: CPT

## 2025-05-20 RX ORDER — VENLAFAXINE HYDROCHLORIDE 150 MG/1
150 CAPSULE, EXTENDED RELEASE ORAL DAILY
COMMUNITY

## 2025-05-20 RX ORDER — VENLAFAXINE HYDROCHLORIDE 75 MG/1
75 CAPSULE, EXTENDED RELEASE ORAL DAILY
COMMUNITY

## 2025-05-20 RX ORDER — PANTOPRAZOLE SODIUM 40 MG/1
40 TABLET, DELAYED RELEASE ORAL DAILY
COMMUNITY

## 2025-05-20 RX ORDER — OXYBUTYNIN CHLORIDE 5 MG/1
5 TABLET ORAL DAILY
Status: ON HOLD | COMMUNITY
End: 2025-05-21

## 2025-05-20 RX ORDER — NAPROXEN 500 MG/1
500 TABLET ORAL 2 TIMES DAILY WITH MEALS
Status: ON HOLD | COMMUNITY
End: 2025-05-22

## 2025-05-20 RX ORDER — GABAPENTIN 300 MG/1
600 CAPSULE ORAL 2 TIMES DAILY
COMMUNITY

## 2025-05-20 RX ORDER — AMLODIPINE BESYLATE 5 MG/1
5 TABLET ORAL DAILY
COMMUNITY

## 2025-05-20 ASSESSMENT — CONF VISUAL FIELD
OS_NORMAL: 1
OS_INFERIOR_TEMPORAL_RESTRICTION: 0
OD_INFERIOR_NASAL_RESTRICTION: 0
OD_INFERIOR_TEMPORAL_RESTRICTION: 0
OS_INFERIOR_NASAL_RESTRICTION: 0
OS_SUPERIOR_TEMPORAL_RESTRICTION: 0
OD_NORMAL: 1
METHOD: COUNTING FINGERS
OS_SUPERIOR_NASAL_RESTRICTION: 0
OD_SUPERIOR_TEMPORAL_RESTRICTION: 0
OD_SUPERIOR_NASAL_RESTRICTION: 0

## 2025-05-20 ASSESSMENT — VISUAL ACUITY
CORRECTION_TYPE: GLASSES
OD_CC: 20/20
OS_CC+: -1
METHOD: SNELLEN - LINEAR
OS_CC: 20/20

## 2025-05-20 ASSESSMENT — REFRACTION_WEARINGRX
OS_CYLINDER: +0.50
OS_ADD: +3.00
OD_AXIS: 055
OS_SPHERE: -1.00
OD_SPHERE: +0.75
OD_ADD: +3.00
OD_VPRISM: 1 BD
OS_AXIS: 080
OD_CYLINDER: +1.25
SPECS_TYPE: PAL

## 2025-05-20 NOTE — NURSING NOTE
Chief Complaint(s) and History of Present Illness(es)       Exotropia Follow Up              Laterality: both eyes    Comments: Alignment stable. Noticing brief episodes of diplopia more often.  Inf: pt

## 2025-05-20 NOTE — PROGRESS NOTES
Chief Complaint(s) & History of Present Illness  Chief Complaint(s) and History of Present Illness(es)       Exotropia Follow Up              Laterality: both eyes    Comments: Alignment stable. Noticing brief episodes of diplopia more often.  Inf: pt                       Assessment and Plan:      Andie Waddell is a 69 year old female who presents with:     Monocular exotropia  Stable XT with RHT. No diplopia with up to 40 BI D/N over either eye.  Photos taken.  - Sensorimotor       PLAN:  Proceed with strabismus surgery.

## 2025-05-20 NOTE — ANESTHESIA PREPROCEDURE EVALUATION
Anesthesia Pre-Procedure Evaluation    Patient: Andie Waddell   MRN: [id number] : 1955          Procedure : Procedure(s):  RECESSION OR RESECTION, MUSCLE, EXTRAOCULAR, FOR STRABISMUS CORRECTION  BILATERAL UPPER EYELID PTOSIS REPAIR         Past Medical History:   Diagnosis Date    Exotropia, alternating       Past Surgical History:   Procedure Laterality Date    RECESSION RESECTION (REPAIR STRABISMUS) BILATERAL Bilateral 2022    Procedure: Bilateral Strabismus repair;  Surgeon: Julianne Melendez MD;  Location: [facility] OR    RECESSION RESECTION (REPAIR STRABISMUS) BILATERAL Bilateral 2022    Procedure: BILATERAL STRABISMUS REPAIR;  Surgeon: Julianne Melendez MD;  Location: [facility] OR    STRABISMUS SURGERY Bilateral     Esotropia, early  in [place], [place]      Allergies   Allergen Reactions    Codeine Nausea and Vomiting and Other (See Comments)     Unconsciousness       Social History     Tobacco Use    Smoking status: Former    Smokeless tobacco: Never   Substance Use Topics    Alcohol use: Not Currently      Wt Readings from Last 1 Encounters:   22 69.5 kg (153 lb 3.5 oz)        Anesthesia Evaluation            ROS/MED HX  ENT/Pulmonary: Comment: Recurrent exotropia  Ptosis/brow ptosis  Pulmonary nodules      (+) sleep apnea, uses CPAP,                                      Neurologic: Comment: Small fiber neuropathy   Tremors  Alzheimer's       Cardiovascular:     (+) Dyslipidemia - -   -  - -                                      METS/Exercise Tolerance: >4 METS    Hematologic:     (+)      anemia (iron deficiency),          Musculoskeletal: Comment: Osteoarthritis  Chronic neck pain      GI/Hepatic: Comment: Irritable bowel syndrome    (+) GERD (Well controlled, no sxs, no medications),  esophageal disease (diverticulum),                 Renal/Genitourinary:       Endo:     (+)  type II DM,                    Psychiatric/Substance Use:     (+) psychiatric history anxiety and  "depression       Infectious Disease:       Malignancy: Comment: Pulmonary nodules  Colon cancer in remission      Other:              Physical Exam  Airway  Mallampati: I  TM distance: >3 FB  Neck ROM: full  Upper bite lip test: I  Mouth opening: >= 4 cm    Cardiovascular   Rhythm: regular  Rate: normal rate     Dental   (+) Modest Abnormalities - crowns, retainers, 1 or 2 missing teeth        Pulmonary - normal examBreath sounds clear to auscultation        Neurological - normal exam  She appears awake, alert and oriented x3.    Other Findings Hx of Alzheimer's disease in chart, but per patient was a diagnosis made in the past, but has not had ongoing issues with memory. It  has remained on her chart despite this.      OUTSIDE LABS:  CBC: No results found for: \"WBC\", \"HGB\", \"HCT\", \"PLT\"  BMP:   Lab Results   Component Value Date     (H) 11/16/2022     (H) 05/25/2022     COAGS: No results found for: \"PTT\", \"INR\", \"FIBR\"  POC: No results found for: \"BGM\", \"HCG\", \"HCGS\"  HEPATIC: No results found for: \"ALBUMIN\", \"PROTTOTAL\", \"ALT\", \"AST\", \"GGT\", \"ALKPHOS\", \"BILITOTAL\", \"BILIDIRECT\", \"LAURA\"  OTHER: No results found for: \"PH\", \"LACT\", \"A1C\", \"BALWINDER\", \"PHOS\", \"MAG\", \"LIPASE\", \"AMYLASE\", \"TSH\", \"T4\", \"T3\", \"CRP\", \"SED\"    Anesthesia Plan    ASA Status:  3      NPO Status: NPO Appropriate   Anesthesia Type: General.  Airway: supraglottic airway.  Induction: intravenous.  Maintenance: TIVA.   Techniques and Equipment:     - Airway:  Planned airway equipment includes supraglottic airway.     - Monitoring Plan: standard ASA monitoring, processed EEG monitor     Consents    Anesthesia Plan(s) and associated risks, benefits, and realistic alternatives discussed. Questions answered and patient/representative(s) expressed understanding.     - Discussed: anesthesiologist     - Discussed with:  Patient        - Pt is DNR/DNI Status: no DNR     Blood Consent:      - Discussed with: not discussed.     Postoperative " Care    Pain management: non-narcotic analgesics, plan for postoperative opioid use, multimodal analgesia.     Comments:                   Justus Barnard MD    I have reviewed the pertinent notes and labs in the chart from the past 30 days and (re)examined the patient.  Any updates or changes from those notes are reflected in this note.    Clinically Significant Risk Factors Present on Admission

## 2025-05-21 ENCOUNTER — HOSPITAL ENCOUNTER (OUTPATIENT)
Facility: CLINIC | Age: 70
Setting detail: OBSERVATION
Discharge: HOME | End: 2025-05-22
Attending: OPHTHALMOLOGY | Admitting: INTERNAL MEDICINE
Payer: MEDICARE

## 2025-05-21 ENCOUNTER — ANESTHESIA (OUTPATIENT)
Dept: SURGERY | Facility: CLINIC | Age: 70
End: 2025-05-21
Payer: MEDICARE

## 2025-05-21 DIAGNOSIS — Z98.890 POSTOPERATIVE EYE STATE: ICD-10-CM

## 2025-05-21 DIAGNOSIS — H02.403 ACQUIRED INVOLUTIONAL PTOSIS OF BOTH EYELIDS: Primary | ICD-10-CM

## 2025-05-21 LAB
GLUCOSE BLDC GLUCOMTR-MCNC: 99 MG/DL (ref 70–99)
GLUCOSE BLDC GLUCOMTR-MCNC: 99 MG/DL (ref 70–99)

## 2025-05-21 PROCEDURE — 250N000011 HC RX IP 250 OP 636: Mod: JZ | Performed by: INTERNAL MEDICINE

## 2025-05-21 PROCEDURE — 250N000011 HC RX IP 250 OP 636: Mod: JZ | Performed by: ANESTHESIOLOGY

## 2025-05-21 PROCEDURE — 250N000013 HC RX MED GY IP 250 OP 250 PS 637

## 2025-05-21 PROCEDURE — 250N000011 HC RX IP 250 OP 636: Mod: JZ

## 2025-05-21 PROCEDURE — 250N000011 HC RX IP 250 OP 636: Performed by: OPHTHALMOLOGY

## 2025-05-21 PROCEDURE — 67908 REPAIR EYELID DEFECT: CPT | Mod: 50 | Performed by: OPHTHALMOLOGY

## 2025-05-21 PROCEDURE — 96374 THER/PROPH/DIAG INJ IV PUSH: CPT

## 2025-05-21 PROCEDURE — 250N000009 HC RX 250: Performed by: OPHTHALMOLOGY

## 2025-05-21 PROCEDURE — 250N000009 HC RX 250

## 2025-05-21 PROCEDURE — 99222 1ST HOSP IP/OBS MODERATE 55: CPT | Performed by: INTERNAL MEDICINE

## 2025-05-21 PROCEDURE — 272N000001 HC OR GENERAL SUPPLY STERILE: Performed by: OPHTHALMOLOGY

## 2025-05-21 PROCEDURE — 360N000076 HC SURGERY LEVEL 3, PER MIN: Performed by: OPHTHALMOLOGY

## 2025-05-21 PROCEDURE — 370N000017 HC ANESTHESIA TECHNICAL FEE, PER MIN: Performed by: OPHTHALMOLOGY

## 2025-05-21 PROCEDURE — 258N000003 HC RX IP 258 OP 636

## 2025-05-21 PROCEDURE — 710N000010 HC RECOVERY PHASE 1, LEVEL 2, PER MIN: Performed by: OPHTHALMOLOGY

## 2025-05-21 PROCEDURE — 82962 GLUCOSE BLOOD TEST: CPT

## 2025-05-21 PROCEDURE — 96375 TX/PRO/DX INJ NEW DRUG ADDON: CPT

## 2025-05-21 PROCEDURE — 999N000141 HC STATISTIC PRE-PROCEDURE NURSING ASSESSMENT: Performed by: OPHTHALMOLOGY

## 2025-05-21 PROCEDURE — 250N000013 HC RX MED GY IP 250 OP 250 PS 637: Performed by: INTERNAL MEDICINE

## 2025-05-21 PROCEDURE — G0378 HOSPITAL OBSERVATION PER HR: HCPCS

## 2025-05-21 PROCEDURE — 710N000012 HC RECOVERY PHASE 2, PER MINUTE: Performed by: OPHTHALMOLOGY

## 2025-05-21 RX ORDER — PROCHLORPERAZINE MALEATE 5 MG/1
5 TABLET ORAL EVERY 6 HOURS PRN
Status: DISCONTINUED | OUTPATIENT
Start: 2025-05-21 | End: 2025-05-22 | Stop reason: HOSPADM

## 2025-05-21 RX ORDER — ONDANSETRON 4 MG/1
4 TABLET, ORALLY DISINTEGRATING ORAL EVERY 30 MIN PRN
Status: DISCONTINUED | OUTPATIENT
Start: 2025-05-21 | End: 2025-05-21 | Stop reason: HOSPADM

## 2025-05-21 RX ORDER — FENTANYL CITRATE-0.9 % NACL/PF 10 MCG/ML
PLASTIC BAG, INJECTION (ML) INTRAVENOUS PRN
Status: DISCONTINUED | OUTPATIENT
Start: 2025-05-21 | End: 2025-05-21

## 2025-05-21 RX ORDER — NALOXONE HYDROCHLORIDE 0.4 MG/ML
0.2 INJECTION, SOLUTION INTRAMUSCULAR; INTRAVENOUS; SUBCUTANEOUS
Status: DISCONTINUED | OUTPATIENT
Start: 2025-05-21 | End: 2025-05-22 | Stop reason: HOSPADM

## 2025-05-21 RX ORDER — FENTANYL CITRATE 50 UG/ML
50 INJECTION, SOLUTION INTRAMUSCULAR; INTRAVENOUS EVERY 5 MIN PRN
Refills: 0 | Status: CANCELLED | OUTPATIENT
Start: 2025-05-21

## 2025-05-21 RX ORDER — GABAPENTIN 300 MG/1
600 CAPSULE ORAL 2 TIMES DAILY
Status: DISCONTINUED | OUTPATIENT
Start: 2025-05-21 | End: 2025-05-22 | Stop reason: HOSPADM

## 2025-05-21 RX ORDER — OXYCODONE HYDROCHLORIDE 5 MG/1
5 TABLET ORAL EVERY 4 HOURS PRN
Status: DISCONTINUED | OUTPATIENT
Start: 2025-05-21 | End: 2025-05-21

## 2025-05-21 RX ORDER — PROPRANOLOL HCL 20 MG
20 TABLET ORAL 3 TIMES DAILY PRN
Status: DISCONTINUED | OUTPATIENT
Start: 2025-05-21 | End: 2025-05-22 | Stop reason: HOSPADM

## 2025-05-21 RX ORDER — ALBUTEROL SULFATE 0.83 MG/ML
2.5 SOLUTION RESPIRATORY (INHALATION) EVERY 4 HOURS PRN
Status: CANCELLED | OUTPATIENT
Start: 2025-05-21

## 2025-05-21 RX ORDER — ACETAMINOPHEN 325 MG/1
650 TABLET ORAL EVERY 4 HOURS PRN
Status: DISCONTINUED | OUTPATIENT
Start: 2025-05-21 | End: 2025-05-22 | Stop reason: HOSPADM

## 2025-05-21 RX ORDER — OXYCODONE HYDROCHLORIDE 10 MG/1
10 TABLET ORAL EVERY 4 HOURS PRN
Refills: 0 | Status: DISCONTINUED | OUTPATIENT
Start: 2025-05-21 | End: 2025-05-22 | Stop reason: HOSPADM

## 2025-05-21 RX ORDER — SODIUM CHLORIDE, SODIUM LACTATE, POTASSIUM CHLORIDE, CALCIUM CHLORIDE 600; 310; 30; 20 MG/100ML; MG/100ML; MG/100ML; MG/100ML
INJECTION, SOLUTION INTRAVENOUS CONTINUOUS
Status: DISCONTINUED | OUTPATIENT
Start: 2025-05-21 | End: 2025-05-21 | Stop reason: HOSPADM

## 2025-05-21 RX ORDER — NALOXONE HYDROCHLORIDE 0.4 MG/ML
0.4 INJECTION, SOLUTION INTRAMUSCULAR; INTRAVENOUS; SUBCUTANEOUS
Status: DISCONTINUED | OUTPATIENT
Start: 2025-05-21 | End: 2025-05-22 | Stop reason: HOSPADM

## 2025-05-21 RX ORDER — NALOXONE HYDROCHLORIDE 0.4 MG/ML
0.1 INJECTION, SOLUTION INTRAMUSCULAR; INTRAVENOUS; SUBCUTANEOUS
Status: CANCELLED | OUTPATIENT
Start: 2025-05-21

## 2025-05-21 RX ORDER — LIDOCAINE 40 MG/G
CREAM TOPICAL
Status: DISCONTINUED | OUTPATIENT
Start: 2025-05-21 | End: 2025-05-21 | Stop reason: HOSPADM

## 2025-05-21 RX ORDER — HYDROMORPHONE HYDROCHLORIDE 1 MG/ML
0.2 INJECTION, SOLUTION INTRAMUSCULAR; INTRAVENOUS; SUBCUTANEOUS EVERY 5 MIN PRN
Refills: 0 | Status: CANCELLED | OUTPATIENT
Start: 2025-05-21

## 2025-05-21 RX ORDER — OXYBUTYNIN CHLORIDE 5 MG/1
5 TABLET, EXTENDED RELEASE ORAL DAILY
COMMUNITY

## 2025-05-21 RX ORDER — ONDANSETRON 2 MG/ML
INJECTION INTRAMUSCULAR; INTRAVENOUS PRN
Status: DISCONTINUED | OUTPATIENT
Start: 2025-05-21 | End: 2025-05-21

## 2025-05-21 RX ORDER — PROPOFOL 10 MG/ML
INJECTION, EMULSION INTRAVENOUS CONTINUOUS PRN
Status: DISCONTINUED | OUTPATIENT
Start: 2025-05-21 | End: 2025-05-21

## 2025-05-21 RX ORDER — BALANCED SALT SOLUTION 6.4; .75; .48; .3; 3.9; 1.7 MG/ML; MG/ML; MG/ML; MG/ML; MG/ML; MG/ML
SOLUTION OPHTHALMIC PRN
Status: DISCONTINUED | OUTPATIENT
Start: 2025-05-21 | End: 2025-05-21 | Stop reason: HOSPADM

## 2025-05-21 RX ORDER — VENLAFAXINE HYDROCHLORIDE 75 MG/1
75 CAPSULE, EXTENDED RELEASE ORAL DAILY
Status: DISCONTINUED | OUTPATIENT
Start: 2025-05-21 | End: 2025-05-21

## 2025-05-21 RX ORDER — FENTANYL CITRATE 50 UG/ML
25 INJECTION, SOLUTION INTRAMUSCULAR; INTRAVENOUS EVERY 5 MIN PRN
Refills: 0 | Status: CANCELLED | OUTPATIENT
Start: 2025-05-21

## 2025-05-21 RX ORDER — PANTOPRAZOLE SODIUM 40 MG/1
40 TABLET, DELAYED RELEASE ORAL DAILY
Status: DISCONTINUED | OUTPATIENT
Start: 2025-05-22 | End: 2025-05-22 | Stop reason: HOSPADM

## 2025-05-21 RX ORDER — OXYMETAZOLINE HYDROCHLORIDE 0.05 G/100ML
SPRAY NASAL PRN
Status: DISCONTINUED | OUTPATIENT
Start: 2025-05-21 | End: 2025-05-21 | Stop reason: HOSPADM

## 2025-05-21 RX ORDER — AMLODIPINE BESYLATE 5 MG/1
5 TABLET ORAL DAILY
Status: DISCONTINUED | OUTPATIENT
Start: 2025-05-22 | End: 2025-05-22 | Stop reason: HOSPADM

## 2025-05-21 RX ORDER — NALOXONE HYDROCHLORIDE 0.4 MG/ML
0.1 INJECTION, SOLUTION INTRAMUSCULAR; INTRAVENOUS; SUBCUTANEOUS
Status: DISCONTINUED | OUTPATIENT
Start: 2025-05-21 | End: 2025-05-21 | Stop reason: HOSPADM

## 2025-05-21 RX ORDER — OXYCODONE HYDROCHLORIDE 5 MG/1
5 TABLET ORAL EVERY 6 HOURS PRN
Qty: 6 TABLET | Refills: 0 | Status: SHIPPED | OUTPATIENT
Start: 2025-05-21 | End: 2025-05-24

## 2025-05-21 RX ORDER — OXYCODONE HYDROCHLORIDE 10 MG/1
10 TABLET ORAL EVERY 4 HOURS PRN
Status: DISCONTINUED | OUTPATIENT
Start: 2025-05-21 | End: 2025-05-21

## 2025-05-21 RX ORDER — ACETAMINOPHEN 650 MG/1
650 SUPPOSITORY RECTAL EVERY 4 HOURS PRN
Status: DISCONTINUED | OUTPATIENT
Start: 2025-05-21 | End: 2025-05-22 | Stop reason: HOSPADM

## 2025-05-21 RX ORDER — LABETALOL HYDROCHLORIDE 5 MG/ML
10 INJECTION, SOLUTION INTRAVENOUS
Status: CANCELLED | OUTPATIENT
Start: 2025-05-21

## 2025-05-21 RX ORDER — ONDANSETRON 2 MG/ML
4 INJECTION INTRAMUSCULAR; INTRAVENOUS EVERY 6 HOURS PRN
Status: DISCONTINUED | OUTPATIENT
Start: 2025-05-21 | End: 2025-05-22 | Stop reason: HOSPADM

## 2025-05-21 RX ORDER — OXYCODONE HYDROCHLORIDE 5 MG/1
5 TABLET ORAL
Status: COMPLETED | OUTPATIENT
Start: 2025-05-21 | End: 2025-05-21

## 2025-05-21 RX ORDER — ONDANSETRON 2 MG/ML
4 INJECTION INTRAMUSCULAR; INTRAVENOUS EVERY 30 MIN PRN
Status: DISCONTINUED | OUTPATIENT
Start: 2025-05-21 | End: 2025-05-21 | Stop reason: HOSPADM

## 2025-05-21 RX ORDER — FENTANYL CITRATE 50 UG/ML
25 INJECTION, SOLUTION INTRAMUSCULAR; INTRAVENOUS EVERY 5 MIN PRN
Status: DISCONTINUED | OUTPATIENT
Start: 2025-05-21 | End: 2025-05-21 | Stop reason: HOSPADM

## 2025-05-21 RX ORDER — HYDRALAZINE HYDROCHLORIDE 20 MG/ML
2.5-5 INJECTION INTRAMUSCULAR; INTRAVENOUS EVERY 10 MIN PRN
Status: CANCELLED | OUTPATIENT
Start: 2025-05-21

## 2025-05-21 RX ORDER — HYDROMORPHONE HCL IN WATER/PF 6 MG/30 ML
0.2 PATIENT CONTROLLED ANALGESIA SYRINGE INTRAVENOUS
Status: DISCONTINUED | OUTPATIENT
Start: 2025-05-21 | End: 2025-05-22 | Stop reason: HOSPADM

## 2025-05-21 RX ORDER — VENLAFAXINE HYDROCHLORIDE 75 MG/1
150 CAPSULE, EXTENDED RELEASE ORAL DAILY
Status: DISCONTINUED | OUTPATIENT
Start: 2025-05-21 | End: 2025-05-21

## 2025-05-21 RX ORDER — HYDROMORPHONE HYDROCHLORIDE 1 MG/ML
0.4 INJECTION, SOLUTION INTRAMUSCULAR; INTRAVENOUS; SUBCUTANEOUS EVERY 5 MIN PRN
Status: DISCONTINUED | OUTPATIENT
Start: 2025-05-21 | End: 2025-05-21 | Stop reason: HOSPADM

## 2025-05-21 RX ORDER — OXYBUTYNIN CHLORIDE 5 MG/1
5 TABLET, EXTENDED RELEASE ORAL AT BEDTIME
Status: DISCONTINUED | OUTPATIENT
Start: 2025-05-21 | End: 2025-05-22 | Stop reason: HOSPADM

## 2025-05-21 RX ORDER — TRAZODONE HYDROCHLORIDE 100 MG/1
100 TABLET ORAL
Status: DISCONTINUED | OUTPATIENT
Start: 2025-05-21 | End: 2025-05-22 | Stop reason: HOSPADM

## 2025-05-21 RX ORDER — ACETAMINOPHEN 325 MG/1
975 TABLET ORAL ONCE
Status: CANCELLED | OUTPATIENT
Start: 2025-05-21 | End: 2025-05-21

## 2025-05-21 RX ORDER — FENTANYL CITRATE 50 UG/ML
50 INJECTION, SOLUTION INTRAMUSCULAR; INTRAVENOUS EVERY 5 MIN PRN
Status: DISCONTINUED | OUTPATIENT
Start: 2025-05-21 | End: 2025-05-21 | Stop reason: HOSPADM

## 2025-05-21 RX ORDER — HYDROMORPHONE HYDROCHLORIDE 1 MG/ML
0.4 INJECTION, SOLUTION INTRAMUSCULAR; INTRAVENOUS; SUBCUTANEOUS EVERY 5 MIN PRN
Refills: 0 | Status: CANCELLED | OUTPATIENT
Start: 2025-05-21

## 2025-05-21 RX ORDER — LORAZEPAM 2 MG/ML
.5-1 INJECTION INTRAMUSCULAR
Status: CANCELLED | OUTPATIENT
Start: 2025-05-21

## 2025-05-21 RX ORDER — HYDROMORPHONE HCL IN WATER/PF 6 MG/30 ML
0.4 PATIENT CONTROLLED ANALGESIA SYRINGE INTRAVENOUS
Status: DISCONTINUED | OUTPATIENT
Start: 2025-05-21 | End: 2025-05-21

## 2025-05-21 RX ORDER — NAPROXEN 500 MG/1
500 TABLET ORAL 2 TIMES DAILY WITH MEALS
Status: DISCONTINUED | OUTPATIENT
Start: 2025-05-21 | End: 2025-05-22 | Stop reason: HOSPADM

## 2025-05-21 RX ORDER — OXYCODONE HYDROCHLORIDE 5 MG/1
5 TABLET ORAL
Refills: 0 | Status: CANCELLED | OUTPATIENT
Start: 2025-05-21

## 2025-05-21 RX ORDER — DONEPEZIL HYDROCHLORIDE 10 MG/1
20 TABLET, FILM COATED ORAL AT BEDTIME
Status: DISCONTINUED | OUTPATIENT
Start: 2025-05-21 | End: 2025-05-22 | Stop reason: HOSPADM

## 2025-05-21 RX ORDER — DIPHENHYDRAMINE HYDROCHLORIDE 50 MG/ML
12.5 INJECTION, SOLUTION INTRAMUSCULAR; INTRAVENOUS EVERY 6 HOURS PRN
Status: CANCELLED | OUTPATIENT
Start: 2025-05-21

## 2025-05-21 RX ORDER — DEXAMETHASONE SODIUM PHOSPHATE 4 MG/ML
4 INJECTION, SOLUTION INTRA-ARTICULAR; INTRALESIONAL; INTRAMUSCULAR; INTRAVENOUS; SOFT TISSUE
Status: DISCONTINUED | OUTPATIENT
Start: 2025-05-21 | End: 2025-05-21 | Stop reason: HOSPADM

## 2025-05-21 RX ORDER — BUPROPION HYDROCHLORIDE 300 MG/1
300 TABLET ORAL DAILY
Status: DISCONTINUED | OUTPATIENT
Start: 2025-05-21 | End: 2025-05-22 | Stop reason: HOSPADM

## 2025-05-21 RX ORDER — AMOXICILLIN 250 MG
2 CAPSULE ORAL 2 TIMES DAILY PRN
Status: DISCONTINUED | OUTPATIENT
Start: 2025-05-21 | End: 2025-05-22 | Stop reason: HOSPADM

## 2025-05-21 RX ORDER — OXYCODONE HYDROCHLORIDE 10 MG/1
10 TABLET ORAL
Status: COMPLETED | OUTPATIENT
Start: 2025-05-21 | End: 2025-05-21

## 2025-05-21 RX ORDER — BETAMETHASONE SODIUM PHOSPHATE AND BETAMETHASONE ACETATE 3; 3 MG/ML; MG/ML
INJECTION, SUSPENSION INTRA-ARTICULAR; INTRALESIONAL; INTRAMUSCULAR; SOFT TISSUE PRN
Status: DISCONTINUED | OUTPATIENT
Start: 2025-05-21 | End: 2025-05-21 | Stop reason: HOSPADM

## 2025-05-21 RX ORDER — AMOXICILLIN 250 MG
1 CAPSULE ORAL 2 TIMES DAILY PRN
Status: DISCONTINUED | OUTPATIENT
Start: 2025-05-21 | End: 2025-05-22 | Stop reason: HOSPADM

## 2025-05-21 RX ORDER — PROPOFOL 10 MG/ML
INJECTION, EMULSION INTRAVENOUS PRN
Status: DISCONTINUED | OUTPATIENT
Start: 2025-05-21 | End: 2025-05-21

## 2025-05-21 RX ORDER — OXYCODONE HYDROCHLORIDE 5 MG/1
5 TABLET ORAL EVERY 4 HOURS PRN
Refills: 0 | Status: DISCONTINUED | OUTPATIENT
Start: 2025-05-21 | End: 2025-05-22 | Stop reason: HOSPADM

## 2025-05-21 RX ORDER — FENTANYL CITRATE 50 UG/ML
25 INJECTION, SOLUTION INTRAMUSCULAR; INTRAVENOUS
Refills: 0 | Status: CANCELLED | OUTPATIENT
Start: 2025-05-21

## 2025-05-21 RX ORDER — HYDROMORPHONE HCL IN WATER/PF 6 MG/30 ML
0.4 PATIENT CONTROLLED ANALGESIA SYRINGE INTRAVENOUS
Status: DISCONTINUED | OUTPATIENT
Start: 2025-05-21 | End: 2025-05-22 | Stop reason: HOSPADM

## 2025-05-21 RX ORDER — HYDROMORPHONE HCL IN WATER/PF 6 MG/30 ML
0.2 PATIENT CONTROLLED ANALGESIA SYRINGE INTRAVENOUS
Status: DISCONTINUED | OUTPATIENT
Start: 2025-05-21 | End: 2025-05-21

## 2025-05-21 RX ORDER — NEOMYCIN POLYMYXIN B SULFATES AND DEXAMETHASONE 3.5; 10000; 1 MG/ML; [USP'U]/ML; MG/ML
SUSPENSION/ DROPS OPHTHALMIC
Qty: 5 ML | Refills: 0 | Status: SHIPPED | OUTPATIENT
Start: 2025-05-21

## 2025-05-21 RX ORDER — SODIUM CHLORIDE, SODIUM LACTATE, POTASSIUM CHLORIDE, CALCIUM CHLORIDE 600; 310; 30; 20 MG/100ML; MG/100ML; MG/100ML; MG/100ML
INJECTION, SOLUTION INTRAVENOUS CONTINUOUS
Status: CANCELLED | OUTPATIENT
Start: 2025-05-21

## 2025-05-21 RX ORDER — ACETAMINOPHEN 325 MG/1
975 TABLET ORAL ONCE
Status: COMPLETED | OUTPATIENT
Start: 2025-05-21 | End: 2025-05-21

## 2025-05-21 RX ORDER — FENTANYL CITRATE 50 UG/ML
INJECTION, SOLUTION INTRAMUSCULAR; INTRAVENOUS PRN
Status: DISCONTINUED | OUTPATIENT
Start: 2025-05-21 | End: 2025-05-21

## 2025-05-21 RX ORDER — VENLAFAXINE HYDROCHLORIDE 75 MG/1
225 CAPSULE, EXTENDED RELEASE ORAL
Status: DISCONTINUED | OUTPATIENT
Start: 2025-05-21 | End: 2025-05-22 | Stop reason: HOSPADM

## 2025-05-21 RX ORDER — OXYCODONE HYDROCHLORIDE 5 MG/1
10 TABLET ORAL
Refills: 0 | Status: CANCELLED | OUTPATIENT
Start: 2025-05-21

## 2025-05-21 RX ORDER — MEPERIDINE HYDROCHLORIDE 25 MG/ML
12.5 INJECTION INTRAMUSCULAR; INTRAVENOUS; SUBCUTANEOUS EVERY 5 MIN PRN
Refills: 0 | Status: CANCELLED | OUTPATIENT
Start: 2025-05-21

## 2025-05-21 RX ORDER — KETOROLAC TROMETHAMINE 30 MG/ML
30 INJECTION, SOLUTION INTRAMUSCULAR; INTRAVENOUS EVERY 6 HOURS PRN
Status: DISCONTINUED | OUTPATIENT
Start: 2025-05-21 | End: 2025-05-21 | Stop reason: HOSPADM

## 2025-05-21 RX ORDER — DIPHENHYDRAMINE HCL 12.5MG/5ML
12.5 LIQUID (ML) ORAL EVERY 6 HOURS PRN
Status: CANCELLED | OUTPATIENT
Start: 2025-05-21

## 2025-05-21 RX ORDER — ONDANSETRON 4 MG/1
4 TABLET, ORALLY DISINTEGRATING ORAL EVERY 6 HOURS PRN
Status: DISCONTINUED | OUTPATIENT
Start: 2025-05-21 | End: 2025-05-22 | Stop reason: HOSPADM

## 2025-05-21 RX ORDER — DEXAMETHASONE SODIUM PHOSPHATE 4 MG/ML
INJECTION, SOLUTION INTRA-ARTICULAR; INTRALESIONAL; INTRAMUSCULAR; INTRAVENOUS; SOFT TISSUE PRN
Status: DISCONTINUED | OUTPATIENT
Start: 2025-05-21 | End: 2025-05-21

## 2025-05-21 RX ORDER — SODIUM CHLORIDE, SODIUM LACTATE, POTASSIUM CHLORIDE, CALCIUM CHLORIDE 600; 310; 30; 20 MG/100ML; MG/100ML; MG/100ML; MG/100ML
INJECTION, SOLUTION INTRAVENOUS CONTINUOUS PRN
Status: DISCONTINUED | OUTPATIENT
Start: 2025-05-21 | End: 2025-05-21

## 2025-05-21 RX ORDER — HYDROMORPHONE HYDROCHLORIDE 1 MG/ML
0.2 INJECTION, SOLUTION INTRAMUSCULAR; INTRAVENOUS; SUBCUTANEOUS EVERY 5 MIN PRN
Status: DISCONTINUED | OUTPATIENT
Start: 2025-05-21 | End: 2025-05-21 | Stop reason: HOSPADM

## 2025-05-21 RX ORDER — KETOROLAC TROMETHAMINE 30 MG/ML
15 INJECTION, SOLUTION INTRAMUSCULAR; INTRAVENOUS
Status: CANCELLED | OUTPATIENT
Start: 2025-05-21

## 2025-05-21 RX ORDER — OXYCODONE HYDROCHLORIDE 5 MG/1
10 TABLET ORAL
Refills: 0 | Status: COMPLETED | OUTPATIENT
Start: 2025-05-21 | End: 2025-05-21

## 2025-05-21 RX ORDER — LIDOCAINE HYDROCHLORIDE 20 MG/ML
INJECTION, SOLUTION INFILTRATION; PERINEURAL PRN
Status: DISCONTINUED | OUTPATIENT
Start: 2025-05-21 | End: 2025-05-21

## 2025-05-21 RX ORDER — GLYCOPYRROLATE 0.2 MG/ML
INJECTION, SOLUTION INTRAMUSCULAR; INTRAVENOUS PRN
Status: DISCONTINUED | OUTPATIENT
Start: 2025-05-21 | End: 2025-05-21

## 2025-05-21 RX ADMIN — DEXAMETHASONE SODIUM PHOSPHATE 4 MG: 4 INJECTION, SOLUTION INTRAMUSCULAR; INTRAVENOUS at 08:32

## 2025-05-21 RX ADMIN — HYDROMORPHONE HYDROCHLORIDE 0.2 MG: 1 INJECTION, SOLUTION INTRAMUSCULAR; INTRAVENOUS; SUBCUTANEOUS at 10:19

## 2025-05-21 RX ADMIN — Medication 100 MCG: at 07:45

## 2025-05-21 RX ADMIN — OXYCODONE 10 MG: 5 TABLET ORAL at 11:52

## 2025-05-21 RX ADMIN — FENTANYL CITRATE 25 MCG: 50 INJECTION INTRAMUSCULAR; INTRAVENOUS at 07:42

## 2025-05-21 RX ADMIN — PROPOFOL 130 MG: 10 INJECTION, EMULSION INTRAVENOUS at 07:42

## 2025-05-21 RX ADMIN — SODIUM CHLORIDE, SODIUM LACTATE, POTASSIUM CHLORIDE, AND CALCIUM CHLORIDE: .6; .31; .03; .02 INJECTION, SOLUTION INTRAVENOUS at 07:34

## 2025-05-21 RX ADMIN — OXYCODONE HYDROCHLORIDE 5 MG: 5 TABLET ORAL at 10:21

## 2025-05-21 RX ADMIN — OXYBUTYNIN CHLORIDE 5 MG: 5 TABLET, EXTENDED RELEASE ORAL at 20:55

## 2025-05-21 RX ADMIN — ACETAMINOPHEN 975 MG: 325 TABLET ORAL at 15:15

## 2025-05-21 RX ADMIN — FENTANYL CITRATE 25 MCG: 50 INJECTION INTRAMUSCULAR; INTRAVENOUS at 08:47

## 2025-05-21 RX ADMIN — FENTANYL CITRATE 25 MCG: 50 INJECTION INTRAMUSCULAR; INTRAVENOUS at 09:50

## 2025-05-21 RX ADMIN — PROPOFOL 30 MG: 10 INJECTION, EMULSION INTRAVENOUS at 08:45

## 2025-05-21 RX ADMIN — GABAPENTIN 600 MG: 300 CAPSULE ORAL at 20:55

## 2025-05-21 RX ADMIN — OXYCODONE 10 MG: 5 TABLET ORAL at 16:42

## 2025-05-21 RX ADMIN — ACETAMINOPHEN 975 MG: 325 TABLET ORAL at 06:55

## 2025-05-21 RX ADMIN — FENTANYL CITRATE 25 MCG: 50 INJECTION INTRAMUSCULAR; INTRAVENOUS at 09:10

## 2025-05-21 RX ADMIN — PROPOFOL 100 MCG/KG/MIN: 10 INJECTION, EMULSION INTRAVENOUS at 07:42

## 2025-05-21 RX ADMIN — HYDROMORPHONE HYDROCHLORIDE 0.4 MG: 0.2 INJECTION, SOLUTION INTRAMUSCULAR; INTRAVENOUS; SUBCUTANEOUS at 22:20

## 2025-05-21 RX ADMIN — FENTANYL CITRATE 50 MCG: 50 INJECTION INTRAMUSCULAR; INTRAVENOUS at 08:39

## 2025-05-21 RX ADMIN — PROPOFOL 20 MG: 10 INJECTION, EMULSION INTRAVENOUS at 09:08

## 2025-05-21 RX ADMIN — Medication 100 MCG: at 08:08

## 2025-05-21 RX ADMIN — FENTANYL CITRATE 25 MCG: 50 INJECTION INTRAMUSCULAR; INTRAVENOUS at 09:39

## 2025-05-21 RX ADMIN — VENLAFAXINE HYDROCHLORIDE 225 MG: 75 CAPSULE, EXTENDED RELEASE ORAL at 18:46

## 2025-05-21 RX ADMIN — LIDOCAINE HYDROCHLORIDE 60 MG: 20 INJECTION, SOLUTION INFILTRATION; PERINEURAL at 07:42

## 2025-05-21 RX ADMIN — BUPROPION HYDROCHLORIDE 300 MG: 300 TABLET, EXTENDED RELEASE ORAL at 18:46

## 2025-05-21 RX ADMIN — PROPOFOL 40 MG: 10 INJECTION, EMULSION INTRAVENOUS at 08:52

## 2025-05-21 RX ADMIN — ONDANSETRON 4 MG: 2 INJECTION INTRAMUSCULAR; INTRAVENOUS at 23:30

## 2025-05-21 RX ADMIN — FENTANYL CITRATE 25 MCG: 50 INJECTION INTRAMUSCULAR; INTRAVENOUS at 08:55

## 2025-05-21 RX ADMIN — ONDANSETRON 4 MG: 2 INJECTION INTRAMUSCULAR; INTRAVENOUS at 09:09

## 2025-05-21 RX ADMIN — HYDROMORPHONE HYDROCHLORIDE 0.2 MG: 1 INJECTION, SOLUTION INTRAMUSCULAR; INTRAVENOUS; SUBCUTANEOUS at 10:07

## 2025-05-21 RX ADMIN — DONEPEZIL HYDROCHLORIDE 20 MG: 10 TABLET ORAL at 22:20

## 2025-05-21 RX ADMIN — ACETAMINOPHEN 650 MG: 325 TABLET, FILM COATED ORAL at 18:46

## 2025-05-21 RX ADMIN — OXYCODONE HYDROCHLORIDE 10 MG: 10 TABLET ORAL at 20:54

## 2025-05-21 RX ADMIN — KETOROLAC TROMETHAMINE 30 MG: 30 INJECTION, SOLUTION INTRAMUSCULAR at 15:39

## 2025-05-21 RX ADMIN — FENTANYL CITRATE 25 MCG: 50 INJECTION INTRAMUSCULAR; INTRAVENOUS at 10:02

## 2025-05-21 RX ADMIN — FENTANYL CITRATE 25 MCG: 50 INJECTION INTRAMUSCULAR; INTRAVENOUS at 09:55

## 2025-05-21 RX ADMIN — GLYCOPYRROLATE 0.2 MG: 0.2 INJECTION, SOLUTION INTRAMUSCULAR; INTRAVENOUS at 07:49

## 2025-05-21 RX ADMIN — HYDROMORPHONE HYDROCHLORIDE 0.2 MG: 1 INJECTION, SOLUTION INTRAMUSCULAR; INTRAVENOUS; SUBCUTANEOUS at 10:13

## 2025-05-21 ASSESSMENT — ACTIVITIES OF DAILY LIVING (ADL)
ADLS_ACUITY_SCORE: 34
ADLS_ACUITY_SCORE: 36
ADLS_ACUITY_SCORE: 36
ADLS_ACUITY_SCORE: 35
ADLS_ACUITY_SCORE: 34
ADLS_ACUITY_SCORE: 35
ADLS_ACUITY_SCORE: 33
ADLS_ACUITY_SCORE: 36
ADLS_ACUITY_SCORE: 35
ADLS_ACUITY_SCORE: 34
ADLS_ACUITY_SCORE: 35
ADLS_ACUITY_SCORE: 36
ADLS_ACUITY_SCORE: 34
ADLS_ACUITY_SCORE: 36
ADLS_ACUITY_SCORE: 35

## 2025-05-21 ASSESSMENT — COLUMBIA-SUICIDE SEVERITY RATING SCALE - C-SSRS
2. HAVE YOU ACTUALLY HAD ANY THOUGHTS OF KILLING YOURSELF IN THE PAST MONTH?: NO
1. IN THE PAST MONTH, HAVE YOU WISHED YOU WERE DEAD OR WISHED YOU COULD GO TO SLEEP AND NOT WAKE UP?: NO
6. HAVE YOU EVER DONE ANYTHING, STARTED TO DO ANYTHING, OR PREPARED TO DO ANYTHING TO END YOUR LIFE?: NO

## 2025-05-21 NOTE — ANESTHESIA CARE TRANSFER NOTE
Patient: Andie Waddell    Procedure: Procedure(s):  RECESSION OR RESECTION, MUSCLE, EXTRAOCULAR, FOR STRABISMUS CORRECTION, RIGHT EYE  BILATERAL UPPER EYELID PTOSIS REPAIR       Diagnosis: Monocular exotropia [H50.10]  Mechanical ptosis of eyelid of both eyes [H02.413]  Diagnosis Additional Information: No value filed.    Anesthesia Type:   General     Note:    Oropharynx: oropharynx clear of all foreign objects and spontaneously breathing  Level of Consciousness: drowsy and awake  Oxygen Supplementation: face mask  Level of Supplemental Oxygen (L/min / FiO2): 6  Independent Airway: airway patency satisfactory and stable  Dentition: dentition unchanged  Vital Signs Stable: post-procedure vital signs reviewed and stable  Report to RN Given: handoff report given  Patient transferred to: PACU    Handoff Report: Identifed the Patient, Identified the Reponsible Provider, Reviewed the pertinent medical history, Discussed the surgical course, Reviewed Intra-OP anesthesia mangement and issues during anesthesia, Set expectations for post-procedure period and Allowed opportunity for questions and acknowledgement of understanding    Vitals:  Vitals Value Taken Time   BP     Temp     Pulse 63 05/21/25 09:37   Resp 13 05/21/25 09:37   SpO2 94 % 05/21/25 09:37   Vitals shown include unfiled device data.    Electronically Signed By: Justus Barnard MD  May 21, 2025  9:38 AM

## 2025-05-21 NOTE — ANESTHESIA PROCEDURE NOTES
Airway       Patient location during procedure: OR       Procedure Start/Stop Times: 5/21/2025 7:43 AM  Staff -        Anesthesiologist:  Violet Riley MD       Resident/Fellow: Justus Barnard MD       Performed By: resident  Consent for Airway        Urgency: elective  Indications and Patient Condition       Indications for airway management: arthur-procedural         Mask difficulty assessment: 0 - not attempted    Final Airway Details       Final airway type: supraglottic airway    Supraglottic Airway Details        Type: LMA       Brand: I-Gel       LMA size: 4    Post intubation assessment        Placement verified by: capnometry, equal breath sounds and chest rise        Number of attempts at approach: 1       Number of other approaches attempted: 0       Secured with: tape       Ease of procedure: easy       Dentition: Intact    Medication(s) Administered   Medication Administration Time: 5/21/2025 7:43 AM

## 2025-05-21 NOTE — H&P
[facility]    History and Physical - Hospitalist Service, GOLD TEAM        Date of Admission:  5/21/2025    Assessment & Plan      Andie Waddell is a 69 year old female admitted on 5/21/2025. She has a history of diabetes, hypertension, HARVINDER, GERD, depression, early onset Alzheimer's, colon cancer in remission, tremor.  Patient underwent Recession or Resection of Extraocular Muscle for Strabismus Correction in the Right Eye Bilateral Upper Eyelid Ptosis Repair.  Postop hypoxia noted.  Patient admitted for observation purposes.    # S/p Recession or Resection of Extraocular Muscle for Strabismus Correction in the Right Eye Bilateral Upper Eyelid Ptosis Repair  - ophthalmology consulted for post-op care  - Pain control with prn oral and IV dilaudid     # Post-op hypoxia  - Likely due to atelectasis  - keep patient on Capno   - home CPAP tonight       # Hypertension-continue PTA amlodipine  # Cognitive impairment-continue PTA donepezil  # Depression, anxiety-continue PTA bupropion, Effexor, as needed trazodone, as needed propranolol  # GERD-continue PTA pantoprazole  # HARVINDER-CPAP nightly  # Type 2 diabetes mellitus-insulin sliding scale            Diet:  Regular   DVT Prophylaxis: Low Risk/Ambulatory with no VTE prophylaxis indicated  Muñiz Catheter: Not present  Lines: None     Cardiac Monitoring: ACTIVE order. Indication: Procedural area  Code Status:  Full Code     Clinically Significant Risk Factors Present on Admission                                        Disposition Plan     Medically Ready for Discharge: Anticipated Tomorrow           TREMAINE MYERS MD  Hospitalist Service, GOLD TEAM   [facility]  Securely message with Ember (more info)  Text page via 3D Forms Paging/Directory   See signed in provider for up to date coverage information    ______________________________________________________________________    Chief  Complaint   Post-op eye surgery    History is obtained from the patient    History of Present Illness   Andie Waddell is a 69 year old female who diabetes, hypertension, HARVINDER, GERD, depression, early onset Alzheimer's, colon cancer in remission, tremor.     Patient seen in the postop phase following eye surgery.  She currently reports significant pain.  Pain is mostly located behind both of her eyes.  She denies any chest pain, no abdominal pain, no shortness of breath.  Of note, she was noted to be hypoxic to the low 70s/80s whenever she falls asleep.  She will be admitted under observation for concerns of postop hypoxia.        Past Medical History    Past Medical History:   Diagnosis Date    Anxiety     Depression     Diabetes (H)     Early onset Alzheimer dementia (H)     Exotropia, alternating     Gastroesophageal reflux disease     Insomnia     Pulmonary nodules     Sleep apnea     Tremor        Past Surgical History   Past Surgical History:   Procedure Laterality Date    cataract removal Bilateral     HYSTERECTOMY      LUMBAR FUSION      L1-L5    RECESSION RESECTION (REPAIR STRABISMUS) BILATERAL Bilateral 05/25/2022    Procedure: Bilateral Strabismus repair;  Surgeon: Julianne Melendez MD;  Location: [facility] OR    RECESSION RESECTION (REPAIR STRABISMUS) BILATERAL Bilateral 11/16/2022    Procedure: BILATERAL STRABISMUS REPAIR;  Surgeon: Julianne Melendez MD;  Location: [facility] OR    STRABISMUS SURGERY Bilateral     Esotropia, early 1980s in [place], [place]       Prior to Admission Medications   Prior to Admission Medications   Prescriptions Last Dose Informant Patient Reported? Taking?   Semaglutide (RYBELSUS) 7 MG tablet Past Week  Yes Yes   Sig: Take 7 mg by mouth daily.   amLODIPine (NORVASC) 5 MG tablet 5/20/2025  Yes Yes   Sig: Take 5 mg by mouth daily.   buPROPion (WELLBUTRIN XL) 300 MG 24 hr tablet 5/20/2025  Yes Yes   Sig: Take 300 mg by mouth   donepezil (ARICEPT) 10 MG tablet 5/20/2025  Yes Yes   Sig:  Take 20 mg by mouth at bedtime.   gabapentin (NEURONTIN) 300 MG capsule Past Week  Yes Yes   Sig: Take 600 mg by mouth 2 times daily.   metFORMIN (GLUCOPHAGE) 500 MG tablet Past Week  Yes Yes   Sig: Take 500 mg by mouth daily (with breakfast).   naproxen (NAPROSYN) 500 MG tablet Past Week  Yes Yes   Sig: Take 500 mg by mouth 2 times daily (with meals).   oxyBUTYnin (DITROPAN) 5 MG tablet 5/20/2025  Yes Yes   Sig: Take 5 mg by mouth daily.   pantoprazole (PROTONIX) 40 MG EC tablet 5/20/2025  Yes Yes   Sig: Take 40 mg by mouth daily.   propranolol (INDERAL) 20 MG tablet 5/20/2025  Yes Yes   Sig: Take 20 mg by mouth 3 times daily as needed.   traZODone (DESYREL) 100 MG tablet 5/20/2025  Yes Yes   Sig: TAKE 2 TABLETS EVERY NIGHT AT BEDTIME   venlafaxine (EFFEXOR XR) 150 MG 24 hr capsule 5/20/2025  Yes Yes   Sig: Take 150 mg by mouth daily. Take with 75 mg tablet for total of 225 mg daily   venlafaxine (EFFEXOR XR) 75 MG 24 hr capsule 5/20/2025  Yes Yes   Sig: Take 75 mg by mouth daily. Take with 150 mg tablet for total of 225 mg daily      Facility-Administered Medications: None           Physical Exam   Vital Signs: Temp: 97.9  F (36.6  C) Temp src: Oral BP: 139/61 Pulse: 59   Resp: 20 SpO2: 100 % O2 Device: Nasal cannula Oxygen Delivery: 1/2 LPM  Weight: 131 lbs 13.36 oz    General Appearance: Lying in bed, on supplemental oxygen, in mild distress.     HEENT: PERRL: EOMI; moist mucous membrane w/o lesions  Neck: No JVD  Pulmonary: Clear to auscultation bilaterally, no wheezes or crackles  CVS: Regular rhythm, no murmurs, rubs or gallops  GI: BS (+), soft nontender, no rebound or guarding   Extremities: No LE edema   Skin: No rashes or lesions  Neurologic: A&O x3      Medical Decision Making       55 MINUTES SPENT BY ME on the date of service doing chart review, history, exam, documentation & further activities per the note.      Data   ------------------------- PAST 24 HR DATA REVIEWED  -----------------------------------------------        Imaging results reviewed over the past 24 hrs:   No results found for this or any previous visit (from the past 24 hours).

## 2025-05-21 NOTE — OP NOTE
PREOPERATIVE DIAGNOSIS: Both upper eyelid ptosis.   POSTOPERATIVE DIAGNOSIS:  Both upper eyelid ptosis.   PROCEDURE: Bilateral upper eyelid ptosis repair - posterior approach 8 mm advancement + 1 mm superior tarsectomy  SURGEON: Girish Cabrera MD  ASSISTANT: None  ANESTHESIA: General with local infiltration of a 50/50 mixture of 2% lidocaine with epinephrine and 0.5% Marcaine.   COMPLICATIONS: None.   ESTIMATED BLOOD LOSS: 1 mL   HISTORY: Andie Waddell presented with upper lid ptosis interfering with the superior visual field and activities of daily living. Additionally she has strabismus and will undergo strabismus surgery with Dr. Melendez. After the risks, benefits and alternatives to the proposed procedure were explained, informed consent was obtained.   PROCEDURE: The patient was brought to the operating room and placed supine on the operating table. Under general anesthesia, the bilateral upper eyelid was infiltrated with local anesthetic and  was prepped and draped in the typical sterile ophthalmic fashion. Atttention was directed to the right  side.  A 4-0 silk suture was placed through the eyelid margin and the eyelid everted over a Desmarres retractor. A 5-0 silk suture was then threaded through the conjunctiva, levator aponeurosis and Leal's muscle 4 mm from the superior tarsal border in order to excise a total of 8 mm of muscle and conjunctiva. These sutures were used to elevate the conjunctiva and Leal's muscle. The Putterman clamp was used and clamped over the elevated tissues, and 1 mm of superior tarsal plate was engaged. A 6-0 plain gut suture was run in a horizontal mattress fashion 1 mm below the clamp from lateral to medial, then medial to lateral. The elevated tissues were excised with a 15 blade. The sutures were then externalized and tied in the lid crease, effectively advancing the Leal's muscle, levator aponeurosis, and conjunctiva and 1 mm of superior tarsal plate. The  silk  suture was removed. The lid was reverted to its normal position and ophthalmic antibiotic ointment placed in the eye.   Attention was directed to the left side where the same procedure was performed. The patient tolerated the procedure well. Then Dr. Melendez began the strabismus portion of the case, please see her separately entered operative note.  Girish Cabrera MD

## 2025-05-21 NOTE — PLAN OF CARE
5 med/surg Admission Note    Reason for admission: eye surgery  Primary team notified of pt arrival.  Admitted from: PACU  Via: bed  Accompanied by: PACU staff  Belongings: Placed in closet; valuables sent home with family  Admission Required Doc Completed: Yes/No  Mobility Devices Utilized by Patient Provided (i.e. walker, wheelchair, etc.): No  Teaching: Orientation to unit and call light- call light within reach, use of console, meal times, when to call for the RN, and enforced importance of safety.  IV Access: L PIV, SL  Telemetry: No  Ht./Wt.: No, completed in AM bu preop  Code Status verified on armband: Yes/No  2 RN Skin Assessment Completed with: Joellen TORRES And Jasmin JALLOH Left inner foot dry scab  Suction/Ambu bag/Flowmeter at bedside: Yes    Pt status:     Temp:  [97.9  F (36.6  C)-98.2  F (36.8  C)] 97.9  F (36.6  C)  Pulse:  [55-66] 59  Resp:  [14-20] 20  BP: ()/() 139/61  SpO2:  [77 %-100 %] 98 %3L

## 2025-05-21 NOTE — ANESTHESIA POSTPROCEDURE EVALUATION
Patient: Andie Waddell    Procedure: Procedure(s):  RECESSION OR RESECTION, MUSCLE, EXTRAOCULAR, FOR STRABISMUS CORRECTION, RIGHT EYE  BILATERAL UPPER EYELID PTOSIS REPAIR       Anesthesia Type:  General    Note:  Disposition: Admission   Postop Pain Control: Challenging            Challenges/Interventions: Multimodal therapy; Acute Pain            Sign Out: Well controlled pain   PONV:    Neuro/Psych: Uneventful            Sign Out: Acceptable/Baseline neuro status   Airway/Respiratory:             Events: REFRACTORY hypoxia            Sign Out: Acceptable/Baseline resp. status; O2 supplementation               Oxygen: Nasal Cannula   CV/Hemodynamics: Uneventful            Sign Out: Acceptable CV status; No obvious hypovolemia; No obvious fluid overload   Other NRE: NONE   DID A NON-ROUTINE EVENT OCCUR? YES    Event details/Postop Comments:  Pt required prolonged wean off O2 via NC and was unable to sustain SP02 above 92% on RA. She continues to drop below 80% when asleep. Pt does us CPAP at night, but her refractory hypoxia extends to when she is awake and talking. Discussed with Dr. Melendez, pt, and . We are admitting to internal medicine who received sign out from Dr. Riley and is aware of the patient.            Last vitals:  Vitals Value Taken Time   BP 90/70 05/21/25 10:30   Temp     Pulse 59 05/21/25 11:00   Resp 18 05/21/25 11:00   SpO2 97 % 05/21/25 11:00       Electronically Signed By: Violet Riley MD  May 21, 2025  3:56 PM

## 2025-05-21 NOTE — PHARMACY-ADMISSION MEDICATION HISTORY
Pharmacist Admission Medication History    Admission medication history is complete. The information provided in this note is only as accurate as the sources available at the time of the update.    Information Source(s): [facility] via N/A    Pertinent Information: Patient was not present for this medication history.  History completed using fill history with last doses entered by pre-op nursing staff. OTC medications may not be reflected on the below list.    Changes made to PTA medication list:  Added: None  Deleted: None  Changed:   Semaglutide - dose change based on fill history  Oxybutynin - changed from IR to XR formulation based on fill history    Allergies reviewed with patient and updates made in EHR: no    Medication History Completed By: Jonathan Ferreira [facility] 5/21/2025 5:54 PM    PTA Med List   Medication Sig Last Dose/Taking    amLODIPine (NORVASC) 5 MG tablet Take 5 mg by mouth daily. 5/20/2025    buPROPion (WELLBUTRIN XL) 300 MG 24 hr tablet Take 300 mg by mouth 5/20/2025    donepezil (ARICEPT) 10 MG tablet Take 20 mg by mouth at bedtime. 5/20/2025    gabapentin (NEURONTIN) 300 MG capsule Take 600 mg by mouth 2 times daily. Past Week    metFORMIN (GLUCOPHAGE) 500 MG tablet Take 500 mg by mouth daily (with breakfast). Past Week    naproxen (NAPROSYN) 500 MG tablet Take 500 mg by mouth 2 times daily (with meals). Past Week    neomycin-polymixin-dexAMETHasone (MAXITROL) 0.1 % ophthalmic suspension One drop to the operated eye four times a day for 10 days. Taking    oxyBUTYnin ER (DITROPAN XL) 5 MG 24 hr tablet Take 5 mg by mouth daily. 5/20/2025    oxyCODONE (ROXICODONE) 5 MG tablet Take 1 tablet (5 mg) by mouth every 6 hours as needed for breakthrough pain. Taking As Needed    pantoprazole (PROTONIX) 40 MG EC tablet Take 40 mg by mouth daily. 5/20/2025    propranolol (INDERAL) 20 MG tablet Take 20 mg by mouth 3 times daily as needed. 5/20/2025    Semaglutide (RYBELSUS) 14 MG tablet Take  14 mg by mouth daily. Past Week    traZODone (DESYREL) 100 MG tablet TAKE 2 TABLETS EVERY NIGHT AT BEDTIME 5/20/2025    venlafaxine (EFFEXOR XR) 150 MG 24 hr capsule Take 150 mg by mouth daily. Take with 75 mg tablet for total of 225 mg daily 5/20/2025    venlafaxine (EFFEXOR XR) 75 MG 24 hr capsule Take 75 mg by mouth daily. Take with 150 mg tablet for total of 225 mg daily 5/20/2025

## 2025-05-21 NOTE — DISCHARGE INSTRUCTIONS
Post-operative Instructions  Ophthalmic Plastic and Reconstructive Surgery    Girish Cabrera M.D.     All instructions apply to the operated eye(s) or eyelid(s).    Wound care and personal care    ? Apply ice compresses and gentle pressure 15 minutes on, 15 minutes off, for 2 days. If you are sleeping, you don't need to wake up to ice. As long as there is no further bleeding, after two days, switch to warm water compresses for five minutes, four times a day until seen by your physician.   ? You may shower or wash your hair the day after surgery. Do not go swimming for at least 2 weeks to prevent contamination of your wounds.  ? Do not apply make-up to the eyes or eyelids for 2 weeks after surgery.  ? Expect some swelling, bruising, black eye (even into the lower eyelids and cheeks). Also expect serum caking, crusting and discharge from the eye and/or incisions. A small amount of surface bleeding, and depending on the type of surgery, bleeding from the inside of the eyelid, is normal for the first 48 hours. Vision can become blurry from blood tinged tears or drops and ointment in your eyes.   ? You may go for walks and light activity is ok, but no heavy (over 15 pounds) lifting, bending or excessive straining for one week.   ? Sleeping with your head elevated, such as in a recliner, for the first several days can help swelling resolve more quickly.   ? Do continue to ambulate (walk) as you normally would - being sedentary after surgery can cause blood clots.   ? Your eye(s) and eyelid(s) may be painful and tender. This is normal after surgery.      Contact information and follow-up  ? Return to the Eye Clinic for a follow-up appointment with your physician as scheduled. If no appointment has been scheduled:   - [phone] for an appointment with Dr. Cabrera within 2 to 3 weeks from your date of surgery.     ? For severe pain, bleeding, or loss of vision, call the [facility] at [phone].    After hours or on weekends and holidays, call [phone] and ask to speak with the ophthalmologist on call.    An on call person can be reached after hours for concerns. The on call doctor should not call in medication refill requests after hours or on weekends, so please plan accordingly.     Medications  ? Avoid Aspirin and ibuprofen for 3 days to reduce the risk of bleeding. You may take Tylenol (acetaminophen).  ? In addition to your home medications, take the following post-operative medications as prescribed by your physician:    ? Apply antibiotic ointment to all sutures three times a day, and into the operated eye(s) at night.   Once you run out, you can apply Vaseline or Aquaphor (over the counter) to the incisions. Don't put the Vaseline or Aquaphor into your eyes.   ? Instill prescribed eye drops 4 times a day for 10 days.   ? If you have ocular irritation, you can use over the counter artificial tears such as Refresh, Systane, or Blink. Do not use Visine, Clear Eyes, or any other drop that gets the red out.     To contact a doctor, call [facility] [phone]  or:  '   [phone] and ask for the Resident On Call for          ophthalmology (answered 24 hours a day)  '   Emergency Department:  [facility] Emergency Department:  [phone]     Next tylenol due at 12:55 PM.

## 2025-05-21 NOTE — OR NURSING
"PACU to Inpatient Nursing Handoff    Patient Andie Waddell is a 69 year old female who speaks English.   Procedure Procedure(s):  RECESSION OR RESECTION, MUSCLE, EXTRAOCULAR, FOR STRABISMUS CORRECTION, RIGHT EYE  BILATERAL UPPER EYELID PTOSIS REPAIR   Surgeon(s) Primary: Julianne Melendez MD     Allergies   Allergen Reactions    Codeine Nausea and Vomiting and Other (See Comments)     Unconsciousness        Isolation  No active isolations     Past Medical History   has a past medical history of Anxiety, Depression, Diabetes (H), Early onset Alzheimer dementia (H), Exotropia, alternating, Gastroesophageal reflux disease, Insomnia, Pulmonary nodules, Sleep apnea, and Tremor.    Anesthesia General   Dermatome Level     Preop Meds acetaminophen (Tylenol) - time given: 0655   Nerve block Not applicable   Intraop Meds dexamethasone (Decadron)  fentanyl (Sublimaze): 150 mcg total  ondansetron (Zofran): last given at 0909  glyco   Local Meds No   Antibiotics Not applicable     Pain Patient Currently in Pain: yes   PACU meds  fentanyl (Sublimaze): 100 mcg (total dose) last given at 1002   hydromorphone (Dilaudid): 0.6 mg (total dose) last given at 1029   oxycodone (Roxicodone): 25 mg (total dose) last given at 1642   Toradol 30 mg at 1539  Tylenol at 1515   PCA / epidural No   Capnography     Telemetry ECG Rhythm: Normal sinus rhythm   Inpatient Telemetry Monitor Ordered? No        Labs Glucose Lab Results   Component Value Date    GLC 99 05/21/2025       Hgb No results found for: \"HGB\"    INR No results found for: \"INR\"   PACU Imaging Not applicable     Wound/Incision Incision/Surgical Site 05/21/25 Bilateral Eye (Active)   Incision Assessment WDL 05/21/25 1327   Closure Sutures 05/21/25 0918   Incision Drainage Amount Small 05/21/25 1100   Drainage Description Serosanguinous 05/21/25 1100   Dressing Intervention Open to air / No Dressing 05/21/25 1327   Number of days: 0      CMS        Equipment Not applicable "   Other LDA       IV Access Peripheral IV 05/21/25 Left;Posterior Hand (Active)   Site Assessment WDL 05/21/25 1105   Line Status Saline locked 05/21/25 1105   Dressing Transparent 05/21/25 1100   Dressing Status clean;dry;intact 05/21/25 0930   Dressing Intervention New dressing  05/21/25 0636   Line Necessity Yes, meets criteria 05/21/25 1100   Phlebitis Scale 0-->no symptoms 05/21/25 1100   Infiltration? no 05/21/25 1100   Number of days: 0      Blood Products Not applicable EBL 0 mL   Intake/Output Date 05/21/25 0700 - 05/22/25 0659   Shift 7747-5043 5792-6773 2888-8538 24 Hour Total   INTAKE   P.O. 480   480   I.V. 800   800   Shift Total(mL/kg) 1280(21.4)   1280(21.4)   OUTPUT   Shift Total(mL/kg)       Weight (kg) 59.8 59.8 59.8 59.8      Drains / Muñiz     Time of void PreOp Time of Void Prior to Procedure: 0600 (05/21/25 0616)    PostOp Urine Occurrence: 1 (05/21/25 1500)    Diapered? No   Bladder Scan      mL (05/21/25 1400)  tolerating sips     Vitals    B/P: 139/61  T: 97.9  F (36.6  C)    Temp src: Oral  P:  Pulse: 59 (05/21/25 1100)          R: 20  O2:  SpO2: 99 %    O2 Device: None (Room air) (05/21/25 1554)    Oxygen Delivery: 1/2 LPM (05/21/25 1545)         Family/support present significant other   Patient belongings     Patient transported on wheelchair   DC meds/scripts (obs/outpt) Yes, meds   Inpatient Pain Meds Released? None ordered       Special needs/considerations None   Tasks needing completion None       Jasmin Giordano, RN  [employer]

## 2025-05-21 NOTE — OP NOTE
DOS 5/21/2025    PREOPERATIVE DIAGNOSIS:    Right exotropia  S/p [facility] in [place] in 1980s.  S/p   Forced duction testing, Exploration, lysis of adhesions on previously operated bimedial rectus muscles,  Excision of 6.5 mm stretch scar from the right medial rectus muscle,  Right lateral rectus recession of 4.0 millimeters, Subconjunctival injections of Celestone, both eyes.on 5/25/2022.     S/p 1.  Forced duction testing,  Exploration, lysis of adhesions on previously operated right lateral rectus muscle,  Resection of 2 mm right lateral rectus with advancement to the original insertion (4.0 mm advancement) 11/16/2022       POSTOPERATIVE DIAGNOSIS: same    PROCEDURE PERFORMED:    Forced duction testing BE  Exploration and lysis of adhesions on Previously operated right lateral rectus muscle.  Right lateral rectus recession 8.0 millimeters  Subconjunctival injection of Celestone right eye.    STAFF SURGEON: Julianne Melendez MD.     ANESTHESIA: General.     ESTIMATED BLOOD LOSS: 1 mL.     COMPLICATIONS: None.     INDICATION FOR PROCEDURE  Andie had recurrent exotropia and risks, benefits, alternatives to repeat strabismus repair were discussed including but not limited to infection, bleeding, loss of vision, over or under correction of alignment, and need for further surgery.  She was evaluated for severe ptosis and so Ptosis repair was also scheduled with Dr. Cabrera.  She elected to proceed.     DETAILS OF PROCEDURE  After informed consent was obtained, Andie was taken to the operating room where general anesthesia was given without complication.  She was prepped and draped in sterile ophthalmic fashion.  A timeout was performed.  Dr. Cabrera then performed ptosis repair.  A timeout was performed for the strabismus repair.  Forced duction testing was performed and there was 2+ tightness to adduction right eye. 5-0 silk traction sutures were placed at 6 and 12 -clock and the eye was placed in the adducted  position. A temporal conjunctival peritomy was performed.  The inferotemporal and superotemporal quadrants were dissected.  The lateral rectus muscle was hooked using small and [place] hooks.  The muscle was cleaned of scar tissue and the conjunctiva was carefully thinned as this was a previously operated muscle.  6-0 double-armed vicryl sutures were used to imbricate the muscle at the insertion using central and peripheral locking bites.  The muscle was disinserted from the globe.  Cautery was used for hemostasis.  A caliper was used to measure 8.0 millimeters posterior from the original insertion and scleral passes were performed at these marks. The muscle was tied down.  The conjunctiva was repaired with 8-0 vicryl suture.  Subconjunctival injection of celestone was given to the right eye.Tobradex ointment was placed in both eyes.    LAURENCE CUEVAS MD

## 2025-05-22 ENCOUNTER — OFFICE VISIT (OUTPATIENT)
Dept: OPHTHALMOLOGY | Facility: CLINIC | Age: 70
End: 2025-05-22
Attending: OPHTHALMOLOGY
Payer: MEDICARE

## 2025-05-22 VITALS
HEIGHT: 64 IN | OXYGEN SATURATION: 94 % | HEART RATE: 60 BPM | BODY MASS INDEX: 22.51 KG/M2 | TEMPERATURE: 98.5 F | SYSTOLIC BLOOD PRESSURE: 142 MMHG | DIASTOLIC BLOOD PRESSURE: 62 MMHG | RESPIRATION RATE: 16 BRPM | WEIGHT: 131.84 LBS

## 2025-05-22 DIAGNOSIS — H50.10 MONOCULAR EXOTROPIA: Primary | ICD-10-CM

## 2025-05-22 DIAGNOSIS — H02.403 INVOLUTIONAL PTOSIS, ACQUIRED, BILATERAL: ICD-10-CM

## 2025-05-22 PROCEDURE — G0378 HOSPITAL OBSERVATION PER HR: HCPCS

## 2025-05-22 PROCEDURE — 99238 HOSP IP/OBS DSCHRG MGMT 30/<: CPT | Performed by: INTERNAL MEDICINE

## 2025-05-22 PROCEDURE — 250N000013 HC RX MED GY IP 250 OP 250 PS 637: Performed by: INTERNAL MEDICINE

## 2025-05-22 PROCEDURE — G0463 HOSPITAL OUTPT CLINIC VISIT: HCPCS | Performed by: OPHTHALMOLOGY

## 2025-05-22 PROCEDURE — 250N000011 HC RX IP 250 OP 636: Performed by: INTERNAL MEDICINE

## 2025-05-22 RX ORDER — ACETAMINOPHEN 325 MG/1
650 TABLET ORAL EVERY 4 HOURS PRN
COMMUNITY
Start: 2025-05-22

## 2025-05-22 RX ADMIN — BUPROPION HYDROCHLORIDE 300 MG: 300 TABLET, EXTENDED RELEASE ORAL at 08:32

## 2025-05-22 RX ADMIN — ACETAMINOPHEN 650 MG: 325 TABLET, FILM COATED ORAL at 08:32

## 2025-05-22 RX ADMIN — AMLODIPINE BESYLATE 5 MG: 5 TABLET ORAL at 08:32

## 2025-05-22 RX ADMIN — ONDANSETRON 4 MG: 4 TABLET, ORALLY DISINTEGRATING ORAL at 08:08

## 2025-05-22 RX ADMIN — OXYCODONE 5 MG: 5 TABLET ORAL at 09:12

## 2025-05-22 RX ADMIN — GABAPENTIN 600 MG: 300 CAPSULE ORAL at 08:32

## 2025-05-22 RX ADMIN — OXYCODONE HYDROCHLORIDE 10 MG: 10 TABLET ORAL at 01:53

## 2025-05-22 RX ADMIN — VENLAFAXINE HYDROCHLORIDE 225 MG: 75 CAPSULE, EXTENDED RELEASE ORAL at 08:32

## 2025-05-22 RX ADMIN — PANTOPRAZOLE SODIUM 40 MG: 40 TABLET, DELAYED RELEASE ORAL at 08:32

## 2025-05-22 ASSESSMENT — ACTIVITIES OF DAILY LIVING (ADL)
ADLS_ACUITY_SCORE: 34

## 2025-05-22 ASSESSMENT — EXTERNAL EXAM - RIGHT EYE: OD_EXAM: BROW PTOSIS

## 2025-05-22 ASSESSMENT — VISUAL ACUITY
METHOD: SNELLEN - LINEAR
CORRECTION_TYPE: GLASSES
OS_CC+: -2
OS_CC: 20/30
OD_CC: 20/25
OD_CC+: -2

## 2025-05-22 NOTE — PLAN OF CARE
Goal Outcome Evaluation:      Plan of Care Reviewed With: patient    Overall Patient Progress: no changeOverall Patient Progress: no change     A&O x3 with intermittent confusion.  On 4L with CPAP overnight.  Pain rated 7-10/10.  Slept most of the night.  Able to make needs known.  R PIV SL.  No acute events overnight.  Continent x2.  POC continues.

## 2025-05-22 NOTE — PROGRESS NOTES
[facility]    Medicine Progress Note - Hospitalist Service, GOLD TEAM 19    Date of Admission:  5/21/2025    Assessment & Plan   A: Patient is 70 y/o woman who has a past medical history significant for diabetes mellitus type II, hypertension, obstructive sleep apnea, GERD, depression, early onset Alzheimer's, colon cancer in remission and tremor. Patient underwent bilateral upper eyelid ptosis repair as well as recession or resection of extraocular muscle for strabismus correction in right eye on 21-May-2025. Patient had post-operative hypoxemia that has resolved.     Hypoxemia might be related to atelectasis as well as combination of obstructive sleep apnea and anesthesia.    P:  1.) Hypoxemia, resolved:  - Monitoring for recurrence.     2.) Ptosis and strabismus s/p surgical intervention  - Patient to continue maxitrol eyedrops and eye ointment as outpatient.  - Patient to f/u with Ophthalmology as scheduled.    3.) Obstructive sleep apnea:  - Patient on CPAP.    4.) Hypertension:  - Patient on norvasc.    5.) Cognitive impairment:  - Patient on donepazil.    6.) Depression:  - Patient on bupropion and venlafaxine.  - Patient on trazodone and propranolol as needed.    7.) Diabetes mellitus type II:  - Patient to resume metformin as outpatient.        Observation Goals: -diagnostic tests and consults completed and resulted, -vital signs normal or at patient baseline, -tolerating oral intake to maintain hydration, -adequate pain control on oral analgesics, -dyspnea improved and O2 sats greater than 88% on room air or prior home oxygen levels, -returns to baseline functional status, -safe disposition plan has been identified, Nurse to notify provider when observation goals have been met and patient is ready for discharge.  Diet: Regular Diet Adult    Muñiz Catheter: Not present  Lines: None     Cardiac Monitoring: None  Code Status: Full Code      Clinically Significant  Risk Factors Present on Admission                                        Social Drivers of Health    Tobacco Use: Medium Risk (5/20/2025)    Patient History     Smoking Tobacco Use: Former     Smokeless Tobacco Use: Never   Physical Activity: Inactive (11/27/2021)    Received from [facility] and [facility]    Exercise Vital Sign     Days of Exercise per Week: 0 days     Minutes of Exercise per Session: 0 min          Disposition Plan     Medically Ready for Discharge: Anticipated Today             Ulises Lomax MD  Hospitalist Service, GOLD TEAM 19  [facility]  Securely message with [employer] (more info)  Text page via [employer] Paging/Directory   See signed in provider for up to date coverage information  ______________________________________________________________________    Interval History   Patient noted feeling well. Patient noted no dyspnea, no cough and no wheezing.     Physical Exam   Vital Signs: Temp: 98.5  F (36.9  C) Temp src: Oral BP: (!) 142/62 Pulse: 60   Resp: 16 SpO2: 94 % O2 Device: None (Room air) Oxygen Delivery: 3 LPM  Weight: 131 lbs 13.36 oz    General: Patient comfortable, NAD.

## 2025-05-22 NOTE — PLAN OF CARE
"Goal Outcome Evaluation:      Plan of Care Reviewed With: patient    Overall Patient Progress: improvingOverall Patient Progress: improving    Outcome Evaluation: cleared for discharge home    Nursing Assessment:  VT: BP (!) 142/62 (BP Location: Left arm, Patient Position: Semi-Ingram's)   Pulse 60   Temp 98.5  F (36.9  C) (Oral)   Resp 16   Ht 1.626 m (5' 4.02\")   Wt 59.8 kg (131 lb 13.4 oz)   SpO2 94%   BMI 22.62 kg/m       Activity: ambulating w/ SBA d/t to vision impairment from surgery    Additional Notes: was gone from floor for a few hours for her eye appointment    Pain Management:  Pain managed w/ PRN tylenol and oxy    Discharge Disposition:  Pt. discharged at 1240 to home, and left with personal belongings. Pt. received complete discharge paperwork and 2 medications as filled by discharge pharmacy. Pt received and signed for the narcotic medication oxycodone. Pt. was given times of last dose for all discharge medications in writing on discharge medication sheets. Discharge teaching included eye drop medication, pain management, activity restrictions, and signs and symptoms of infection. Dressing supplies sent home. Pt. to follow up with PCP in 2 weeks. Pt. had no further questions at the time of discharge and no unmet needs were identified.         "

## 2025-05-23 NOTE — DISCHARGE SUMMARY
[facility]  Hospitalist Discharge Summary      Date of Admission:  21-May-2025  Date of Discharge:   22-May-2025  Discharging Provider: Ulises Lomax MD  Discharge Service: Hospitalist Service, GOLD TEAM 19    Discharge Diagnoses   1.) Hypoxemia   2.) Ptosis and strabismus   3.) Obstructive sleep apnea:   4.) Hypertension   5.) Cognitive impairment   6.) Depression   7.) Diabetes mellitus type II     Clinically Significant Risk Factors          Follow-ups Needed After Discharge   Follow-up Appointments       Hospital Follow-up with Existing Primary Care Provider (PCP)          Schedule Primary Care visit within: 14 Days             Unresulted Labs Ordered in the Past 30 Days of this Admission       No orders found for last 31 day(s).            Discharge Disposition   - Patient was discharged to home in good condition.    Hospital Course    Patient is 68 y/o woman who has a past medical history significant for diabetes mellitus type II, hypertension, obstructive sleep apnea, GERD, depression, early onset Alzheimer's, colon cancer in remission and tremor. Patient underwent bilateral upper eyelid ptosis repair as well as recession or resection of extraocular muscle for strabismus correction in right eye on 21-May-2025. Patient had post-operative hypoxemia that has resolved.      Hypoxemia might be related to atelectasis as well as combination of obstructive sleep apnea and anesthesia.    1.) Hypoxemia:  - This resolved during hospital stay.      2.) Ptosis and strabismus s/p surgical intervention  - Patient to continue maxitrol eyedrops and eye ointment as outpatient.  - Patient to f/u with Ophthalmology as scheduled.     3.) Obstructive sleep apnea:  - Patient on CPAP.     4.) Hypertension:  - Patient on norvasc.     5.) Cognitive impairment:  - Patient on donepazil.     6.) Depression:  - Patient on bupropion and venlafaxine.  - Patient on trazodone and propranolol as  needed.     7.) Diabetes mellitus type II:  - Patient to resume metformin as outpatient.     Consultations This Hospital Stay   INTERNAL MEDICINE ADULT IP CONSULT FOR [facility]  OPHTHALMOLOGY IP CONSULT    Code Status   Prior        Ulises Lomax MD  [facility] SUR[facility]  [address] [place] [id number]  Phone: [phone]  Fax: [phone]  ______________________________________________________________________    Physical Exam   Vital Signs: Temp: 98.5  F (36.9  C) Temp src: Oral BP: (!) 142/62 Pulse: 60   Resp: 16 SpO2: 94 % O2 Device: None (Room air)    Weight: 131 lbs 13.36 oz    General: Patient comfortable, NAD.       Primary Care Physician   Sofia Macedo    Discharge Orders      Ice to affected area    Apply cold pack for 15 minutes on, 15 minutes off, for 48 hours while awake.     Discharge Medication Instructions    Do NOT take aspirin or medications containing NSAIDS for 72 hours after procedure.     Reason for your hospital stay    Hypoxemia     Activity    Your activity upon discharge: As tolerated     Diet    Follow this diet upon discharge: Regular diet     Hospital Follow-up with Existing Primary Care Provider (PCP)            Significant Results and Procedures   - None    Discharge Medications   Discharge Medication List as of 5/22/2025 12:18 PM        START taking these medications    Details   acetaminophen (TYLENOL) 325 MG tablet Take 2 tablets (650 mg) by mouth every 4 hours as needed for mild pain or other (and adjunct with moderate or severe pain or per patient request)., OTC      neomycin-polymixin-dexAMETHasone (MAXITROL) 0.1 % ophthalmic suspension One drop to the operated eye four times a day for 10 days., Disp-5 mL, R-0, E-Prescribe      oxyCODONE (ROXICODONE) 5 MG tablet Take 1 tablet (5 mg) by mouth every 6 hours as needed for breakthrough pain., Disp-6 tablet, R-0, Local Print           CONTINUE these medications which have NOT CHANGED    Details    amLODIPine (NORVASC) 5 MG tablet Take 5 mg by mouth daily., Historical      buPROPion (WELLBUTRIN XL) 300 MG 24 hr tablet Take 300 mg by mouth, Historical      donepezil (ARICEPT) 10 MG tablet Take 20 mg by mouth at bedtime., Historical      gabapentin (NEURONTIN) 300 MG capsule Take 600 mg by mouth 2 times daily., Historical      metFORMIN (GLUCOPHAGE) 500 MG tablet Take 500 mg by mouth daily (with breakfast)., Historical      oxyBUTYnin ER (DITROPAN XL) 5 MG 24 hr tablet Take 5 mg by mouth daily., Historical      pantoprazole (PROTONIX) 40 MG EC tablet Take 40 mg by mouth daily., Historical      propranolol (INDERAL) 20 MG tablet Take 20 mg by mouth 3 times daily as needed., Historical      Semaglutide (RYBELSUS) 14 MG tablet Take 14 mg by mouth daily., Historical      traZODone (DESYREL) 100 MG tablet TAKE 2 TABLETS EVERY NIGHT AT BEDTIME, Historical      !! venlafaxine (EFFEXOR XR) 150 MG 24 hr capsule Take 150 mg by mouth daily. Take with 75 mg tablet for total of 225 mg daily, Historical      !! venlafaxine (EFFEXOR XR) 75 MG 24 hr capsule Take 75 mg by mouth daily. Take with 150 mg tablet for total of 225 mg daily, Historical       !! - Potential duplicate medications found. Please discuss with provider.        STOP taking these medications       naproxen (NAPROSYN) 500 MG tablet Comments:   Reason for Stopping:             Allergies   Allergies   Allergen Reactions    Codeine Nausea and Vomiting and Other (See Comments)     Unconsciousness

## 2025-05-29 ENCOUNTER — TELEPHONE (OUTPATIENT)
Dept: OPHTHALMOLOGY | Facility: CLINIC | Age: 70
End: 2025-05-29
Payer: MEDICARE

## 2025-05-29 DIAGNOSIS — H02.834 DERMATOCHALASIS OF BOTH UPPER EYELIDS: Primary | ICD-10-CM

## 2025-05-29 DIAGNOSIS — H02.831 DERMATOCHALASIS OF BOTH UPPER EYELIDS: Primary | ICD-10-CM

## 2025-05-29 RX ORDER — ERYTHROMYCIN 5 MG/G
0.5 OINTMENT OPHTHALMIC AT BEDTIME
Qty: 3.5 G | Refills: 1 | Status: SHIPPED | OUTPATIENT
Start: 2025-05-29

## 2025-05-29 NOTE — TELEPHONE ENCOUNTER
Health Call Center    Phone Message    May a detailed message be left on voicemail: yes     Reason for Call: Other: Patient is calling to speak to Dr Melendez about some concerns she is having with the pain after the surgery .  On call was called for the patient writer sending TE  alert the team .         Action Taken: Other: Peds eye     Travel Screening: Not Applicable     Date of Service:

## 2025-05-29 NOTE — TELEPHONE ENCOUNTER
Patient called in saying that she was having more pain in her eyes.  Patient says that the irritation in both of her eyes just has not gone away since the surgery.  She attributes it to possibly the eyelid surgery.  She says at first it was the right eye that was irritated with a sense of surface dryness and pain, and then when she woke up this morning her left eye felt really dry with a lot of mucus.  There is not a large increase in the pain to the eyelids, no redness, no fever, no new swelling.    I discussed with the patient that postoperative eye dryness is super common after this, I refilled her erythromycin ointment and told her to use this at night, as well as use the ointment in 1 eye if it is really irritated during the day, and to increase her preservative-free artificial tear usage and to finish her Maxitrol course and to give us a call back in 2 to 3 days if this regimen is not helping.  She felt very reassured by this plan, also gave her return precautions as well.    Vishnu Haque MD on 5/29/2025 at 4:47 PM

## 2025-06-02 ENCOUNTER — MYC MEDICAL ADVICE (OUTPATIENT)
Dept: OPHTHALMOLOGY | Facility: CLINIC | Age: 70
End: 2025-06-02
Payer: MEDICARE

## 2025-06-02 NOTE — TELEPHONE ENCOUNTER
[facility] Call Center    Phone Message    May a detailed message be left on voicemail: yes     Reason for Call: Symptoms or Concerns     If patient has red-flag symptoms, warm transfer to triage line    Current symptom or concern: light sensitivity and eye pain    Symptoms have been present for: Friday 05/29    Has patient previously been seen for this? Yes    By : Dr. Julianne Melendez    Date: 05/22/2025    Are there any new or worsening symptoms? Yes    Action Taken: Peds Eye    Travel Screening: Not Applicable     Date of Service: 05/21/2025 Procedure    Patient Avila is calling back to speak with Dr. Melendez care team. High Priority request sent. Patient called last Friday and spoke with the on call provider (see 05/29 encounter Dr. Vishnu Haque). Avila is still having a lot of pain and light sensitivity, has not improve. Would like to speak with Dr. Melendez care team, please call [phone].

## 2025-06-02 NOTE — TELEPHONE ENCOUNTER
Avila saw Dr. Herrera today. She saw an abrasion super/temporal on both eyes. She discontinued the ointment because of the steroid in it. Now will use ofloxacin TID. Put band aid contacts lens and now she is feeling much better. No redness or discharge. Photophobia has improved.   I will see if Dr. Melendez would still like her to use ointment.     SHERINE Noland  6/3/25 3:49pm      Avila is having eye pain and photosensitivity, started erythromycin agapito and AT on Friday, and eyes are still very painful. She thinks pain is related to the eyelid surgery as there is no redness or discharge. I asked her to send us pictures of the eyes in right and left gaze so we can check the eye muscle surgical sites.   I will forward this message to Dr Shirley (oculo-plastics fellow) and Dr Melendez.  [place]atta Mixon, [place]

## 2025-06-02 NOTE — TELEPHONE ENCOUNTER
After discussing with Dr Melendez and Dr Shirley, and considering that Avila can not get to to [place] by tomorrow, the plan is to see a local doctor for a corneal evaluation tomorrow. Avila will send us a message with the findings and we will plan the next steps accordingly.  Kenyatta M[place], [place]

## 2025-06-03 ENCOUNTER — TELEPHONE (OUTPATIENT)
Dept: OPHTHALMOLOGY | Facility: CLINIC | Age: 70
End: 2025-06-03
Payer: MEDICARE

## 2025-06-03 NOTE — TELEPHONE ENCOUNTER
Spoke with Dr. Laguna. She said to continue the band aid lenses and drops. Okay to stop maxitrol.      Dr. Herrera. Told her to call us and let us know how she is doing in the next 2-3 days.     Nunu Chappell, [place]

## 2025-06-03 NOTE — TELEPHONE ENCOUNTER
[facility] Call Center    Phone Message    May a detailed message be left on voicemail: yes     Reason for Call: Other: Call back      Avila is calling back to speak with someone on Dr. Melendez care team regarding the visit she had today with local clinic and was told to call back. Please give her a call at [phone].    Action Taken: Message routed to:  Other: Peds Eye     Travel Screening: Not Applicable

## 2025-06-05 ENCOUNTER — TELEPHONE (OUTPATIENT)
Dept: OPHTHALMOLOGY | Facility: CLINIC | Age: 70
End: 2025-06-05
Payer: MEDICARE

## 2025-06-05 NOTE — TELEPHONE ENCOUNTER
[facility] Call Center    Phone Message    May a detailed message be left on voicemail: yes     Reason for Call: Other:  Patient is requesting a call back from Dr. Melendez, was told to call in today and speak with her on her recent surgery. Pt was transferred around and very frustrated with getting transferred between Adults and Peds. Writer tried giving information and patient stated they never had this much issues getting to Dr. Melendez. Please call her back. Thanks     Action Taken: Other: peds Eye    Travel Screening: Not Applicable     Date of Service:

## 2025-06-05 NOTE — TELEPHONE ENCOUNTER
Spoke with Sriram Band aid contacts have improved pain. Vision is better. Photosen has improved. RE is more sensitive. She is doing better overall. Will call the local eye clinic to see if she should go in Monday or wait longer to take out the contacts.   She has my direct number in case she needs to call (upset she was on hold).     She wants to let Dr. Melendez know she is doing better.     [place]unu Chappell, [place]

## 2025-06-10 ENCOUNTER — OFFICE VISIT (OUTPATIENT)
Dept: OPHTHALMOLOGY | Facility: CLINIC | Age: 70
End: 2025-06-10
Attending: OPHTHALMOLOGY
Payer: MEDICARE

## 2025-06-10 DIAGNOSIS — H50.10 MONOCULAR EXOTROPIA: Primary | ICD-10-CM

## 2025-06-10 ASSESSMENT — REFRACTION_WEARINGRX
OD_AXIS: 055
OD_ADD: +3.00
OS_ADD: +3.00
OS_AXIS: 090
OS_CYLINDER: +0.50
OD_CYLINDER: +0.75
SPECS_TYPE: BIFOCAL
OS_SPHERE: -1.00
OD_SPHERE: +0.50

## 2025-06-10 ASSESSMENT — REFRACTION_MANIFEST
OD_CYLINDER: +0.75
OD_SPHERE: -0.50
OS_SPHERE: -0.50
OS_CYLINDER: +0.50
OD_AXIS: 055
OS_AXIS: 080

## 2025-06-10 ASSESSMENT — VISUAL ACUITY
OS_CC+: -2
OD_CC: 20/50
OS_CC: 20/25
CORRECTION_TYPE: GLASSES
METHOD: SNELLEN - LINEAR
OD_CC+: -1

## 2025-06-10 ASSESSMENT — TONOMETRY
OD_IOP_MMHG: 9
OS_IOP_MMHG: 9
IOP_METHOD: ICARE

## 2025-06-10 NOTE — NURSING NOTE
Chief Complaint(s) and History of Present Illness(es)       Post Op (Ophthalmology) Right Eye              Laterality: right eye    Comments: Pain in the RE>LE due to corneal abrasions  Saw local doctor, bandage CL and drops TID BE  Pain is still present but much better

## 2025-08-10 ENCOUNTER — HEALTH MAINTENANCE LETTER (OUTPATIENT)
Age: 70
End: 2025-08-10

## 2025-08-26 ENCOUNTER — OFFICE VISIT (OUTPATIENT)
Dept: OPHTHALMOLOGY | Facility: CLINIC | Age: 70
End: 2025-08-26
Attending: OPHTHALMOLOGY
Payer: MEDICARE

## 2025-08-26 DIAGNOSIS — H50.10 MONOCULAR EXOTROPIA: Primary | ICD-10-CM

## 2025-08-26 PROCEDURE — 92060 SENSORIMOTOR EXAMINATION: CPT | Mod: 26 | Performed by: OPHTHALMOLOGY

## 2025-08-26 PROCEDURE — 92012 INTRM OPH EXAM EST PATIENT: CPT | Performed by: OPHTHALMOLOGY

## 2025-08-26 PROCEDURE — 92060 SENSORIMOTOR EXAMINATION: CPT | Performed by: OPHTHALMOLOGY

## 2025-08-26 ASSESSMENT — TONOMETRY
IOP_METHOD: ICARE
OD_IOP_MMHG: 12
OS_IOP_MMHG: 13

## 2025-08-26 ASSESSMENT — VISUAL ACUITY
OD_CC: 20/20
METHOD: SNELLEN - LINEAR
OS_CC: 20/25
OD_CC+: -1
CORRECTION_TYPE: GLASSES
OS_CC+: +2

## 2025-08-26 ASSESSMENT — CONF VISUAL FIELD
OS_INFERIOR_NASAL_RESTRICTION: 0
OD_INFERIOR_NASAL_RESTRICTION: 0
OS_SUPERIOR_TEMPORAL_RESTRICTION: 0
OS_INFERIOR_TEMPORAL_RESTRICTION: 0
OD_INFERIOR_TEMPORAL_RESTRICTION: 0
OD_NORMAL: 1
OD_SUPERIOR_NASAL_RESTRICTION: 0
OD_SUPERIOR_TEMPORAL_RESTRICTION: 0
OS_NORMAL: 1
OS_SUPERIOR_NASAL_RESTRICTION: 0

## 2025-08-26 ASSESSMENT — EXTERNAL EXAM - RIGHT EYE: OD_EXAM: BROW PTOSIS

## 2025-08-26 ASSESSMENT — SLIT LAMP EXAM - LIDS
COMMENTS: NORMAL
COMMENTS: NORMAL

## (undated) DEVICE — EYE MARKING PAD 581057

## (undated) DEVICE — PACK MINOR EYE

## (undated) DEVICE — SU VICRYL 8-0 TG140-8DA 12" J548G

## (undated) DEVICE — GLOVE BIOGEL PI MICRO SZ 6.5 48565

## (undated) DEVICE — SOL WATER IRRIG 1000ML BOTTLE 2F7114

## (undated) DEVICE — DRSG TEGADERM 4X4 3/4" 1626W

## (undated) DEVICE — NDL 30GA 0.5" 305106

## (undated) DEVICE — STRAP KNEE/BODY 31143004

## (undated) DEVICE — POSITIONER ARMBOARD FOAM 1PAIR LF FP-ARMB1

## (undated) DEVICE — ESU EYE LOW TEMP 65410-010

## (undated) DEVICE — NDL 18GA 1.5" 305196

## (undated) DEVICE — STRAP POSITIONING 60X31" BODY KNEE KBS 01

## (undated) DEVICE — TAPE TRANSPORE 1"X1.5YD 1534S-1

## (undated) DEVICE — DRSG TEGADERM 4X10" 1627

## (undated) DEVICE — APPLICATORS COTTON-TIPPED 3" PKG OF 2 C15050-003

## (undated) DEVICE — POSITIONER ARMBOARD FOAM CONVOLUTE CP-501

## (undated) DEVICE — EYE PREP BETADINE 5% SOLUTION 30ML 0065-0411-30

## (undated) DEVICE — GLOVE PROTEXIS MICRO 6.5  2D73PM65

## (undated) DEVICE — LINEN TOWEL PACK X5 5464

## (undated) DEVICE — SU SILK 5-0 TF 18" N266H

## (undated) DEVICE — SU SILK 4-0 G-3DA 18" 783G

## (undated) DEVICE — GLOVE BIOGEL PI MICRO SZ 7.5 48575

## (undated) DEVICE — SYR 03ML LL W/O NDL 309657

## (undated) DEVICE — MARKING PEN ULTRA-FINE WITH RULER 1436SR-100

## (undated) DEVICE — COVER CAMERA IN-LIGHT DISP LT-C02

## (undated) DEVICE — SU VICRYL 6-0 S-29 12" J556G

## (undated) DEVICE — BLADE KNIFE SURG 15 371115

## (undated) DEVICE — SU MERSILENE 6-0 S-24 18" D-7683

## (undated) DEVICE — DRSG EYE PAD STERILE 1.63X2.63" NON21600

## (undated) DEVICE — SU PLAIN 6-0 G-1DA 18" 770G

## (undated) DEVICE — SOLUTION WATER 1000ML BOTTLE R5000-01

## (undated) RX ORDER — ONDANSETRON 2 MG/ML
INJECTION INTRAMUSCULAR; INTRAVENOUS
Status: DISPENSED
Start: 2022-05-25

## (undated) RX ORDER — OXYCODONE HCL 10 MG/1
TABLET, FILM COATED, EXTENDED RELEASE ORAL
Status: DISPENSED
Start: 2025-05-21

## (undated) RX ORDER — GLYCOPYRROLATE 0.2 MG/ML
INJECTION INTRAMUSCULAR; INTRAVENOUS
Status: DISPENSED
Start: 2022-11-16

## (undated) RX ORDER — PROPOFOL 10 MG/ML
INJECTION, EMULSION INTRAVENOUS
Status: DISPENSED
Start: 2025-05-21

## (undated) RX ORDER — FENTANYL CITRATE 50 UG/ML
INJECTION, SOLUTION INTRAMUSCULAR; INTRAVENOUS
Status: DISPENSED
Start: 2022-05-25

## (undated) RX ORDER — ACETAMINOPHEN 325 MG/1
TABLET ORAL
Status: DISPENSED
Start: 2022-11-16

## (undated) RX ORDER — ACETAMINOPHEN 325 MG/1
TABLET ORAL
Status: DISPENSED
Start: 2025-05-21

## (undated) RX ORDER — FENTANYL CITRATE 50 UG/ML
INJECTION, SOLUTION INTRAMUSCULAR; INTRAVENOUS
Status: DISPENSED
Start: 2025-05-21

## (undated) RX ORDER — BETAMETHASONE SODIUM PHOSPHATE AND BETAMETHASONE ACETATE 3; 3 MG/ML; MG/ML
INJECTION, SUSPENSION INTRA-ARTICULAR; INTRALESIONAL; INTRAMUSCULAR; SOFT TISSUE
Status: DISPENSED
Start: 2025-05-21

## (undated) RX ORDER — KETOROLAC TROMETHAMINE 30 MG/ML
INJECTION, SOLUTION INTRAMUSCULAR; INTRAVENOUS
Status: DISPENSED
Start: 2025-05-21

## (undated) RX ORDER — HYDROMORPHONE HYDROCHLORIDE 1 MG/ML
INJECTION, SOLUTION INTRAMUSCULAR; INTRAVENOUS; SUBCUTANEOUS
Status: DISPENSED
Start: 2025-05-21

## (undated) RX ORDER — FENTANYL CITRATE-0.9 % NACL/PF 10 MCG/ML
PLASTIC BAG, INJECTION (ML) INTRAVENOUS
Status: DISPENSED
Start: 2022-11-16

## (undated) RX ORDER — OXYCODONE HYDROCHLORIDE 5 MG/1
TABLET ORAL
Status: DISPENSED
Start: 2025-05-21

## (undated) RX ORDER — OXYCODONE HYDROCHLORIDE 5 MG/1
TABLET ORAL
Status: DISPENSED
Start: 2022-11-16

## (undated) RX ORDER — FENTANYL CITRATE 50 UG/ML
INJECTION, SOLUTION INTRAMUSCULAR; INTRAVENOUS
Status: DISPENSED
Start: 2022-11-16

## (undated) RX ORDER — KETOROLAC TROMETHAMINE 30 MG/ML
INJECTION, SOLUTION INTRAMUSCULAR; INTRAVENOUS
Status: DISPENSED
Start: 2022-05-25

## (undated) RX ORDER — EPHEDRINE SULFATE 50 MG/ML
INJECTION, SOLUTION INTRAMUSCULAR; INTRAVENOUS; SUBCUTANEOUS
Status: DISPENSED
Start: 2022-11-16

## (undated) RX ORDER — ACETAMINOPHEN 325 MG/1
TABLET ORAL
Status: DISPENSED
Start: 2022-05-25

## (undated) RX ORDER — DEXAMETHASONE SODIUM PHOSPHATE 4 MG/ML
INJECTION, SOLUTION INTRA-ARTICULAR; INTRALESIONAL; INTRAMUSCULAR; INTRAVENOUS; SOFT TISSUE
Status: DISPENSED
Start: 2025-05-21

## (undated) RX ORDER — LIDOCAINE HYDROCHLORIDE 20 MG/ML
INJECTION, SOLUTION EPIDURAL; INFILTRATION; INTRACAUDAL; PERINEURAL
Status: DISPENSED
Start: 2022-11-16